# Patient Record
Sex: MALE | Race: BLACK OR AFRICAN AMERICAN | URBAN - METROPOLITAN AREA
[De-identification: names, ages, dates, MRNs, and addresses within clinical notes are randomized per-mention and may not be internally consistent; named-entity substitution may affect disease eponyms.]

---

## 2017-04-06 ENCOUNTER — OFFICE VISIT (OUTPATIENT)
Dept: FAMILY MEDICINE CLINIC | Age: 32
End: 2017-04-06

## 2017-04-06 VITALS
WEIGHT: 315 LBS | RESPIRATION RATE: 17 BRPM | HEART RATE: 91 BPM | HEIGHT: 70 IN | DIASTOLIC BLOOD PRESSURE: 62 MMHG | BODY MASS INDEX: 45.1 KG/M2 | OXYGEN SATURATION: 99 % | SYSTOLIC BLOOD PRESSURE: 110 MMHG | TEMPERATURE: 97.8 F

## 2017-04-06 DIAGNOSIS — E11.9 TYPE 2 DIABETES MELLITUS WITHOUT COMPLICATION, WITHOUT LONG-TERM CURRENT USE OF INSULIN (HCC): Primary | ICD-10-CM

## 2017-04-06 DIAGNOSIS — I10 ESSENTIAL HYPERTENSION WITH GOAL BLOOD PRESSURE LESS THAN 130/80: ICD-10-CM

## 2017-04-06 DIAGNOSIS — E78.00 HYPERCHOLESTEROLEMIA: ICD-10-CM

## 2017-04-06 DIAGNOSIS — Z29.9 PREVENTIVE MEASURE: ICD-10-CM

## 2017-04-06 RX ORDER — PRAVASTATIN SODIUM 20 MG/1
20 TABLET ORAL
COMMUNITY
End: 2017-04-06 | Stop reason: SDUPTHER

## 2017-04-06 RX ORDER — LISINOPRIL 20 MG/1
20 TABLET ORAL DAILY
Qty: 30 TAB | Refills: 3 | Status: SHIPPED | OUTPATIENT
Start: 2017-04-06 | End: 2017-07-24 | Stop reason: SDUPTHER

## 2017-04-06 RX ORDER — METFORMIN HYDROCHLORIDE 500 MG/1
TABLET ORAL 2 TIMES DAILY WITH MEALS
COMMUNITY
End: 2017-04-13 | Stop reason: DRUGHIGH

## 2017-04-06 RX ORDER — PRAVASTATIN SODIUM 20 MG/1
20 TABLET ORAL
Qty: 30 TAB | Refills: 3 | Status: SHIPPED | OUTPATIENT
Start: 2017-04-06 | End: 2017-08-10 | Stop reason: SDUPTHER

## 2017-04-06 RX ORDER — LISINOPRIL 20 MG/1
TABLET ORAL DAILY
COMMUNITY
End: 2017-04-06 | Stop reason: SDUPTHER

## 2017-04-06 NOTE — PATIENT INSTRUCTIONS
Please contact our office if you have any questions about your visit today. 1.) Please get fasting labs next week Wednesday and return next week Thursday for regular appointment. Lab hours: 7:30 am to 4 pm Monday through fasting. Must be 8 hours fasting.     2.) Medications were sent to the pharmacy.

## 2017-04-06 NOTE — PROGRESS NOTES
Chief Complaint   Patient presents with   Peggy Stack Establish Care     1. Have you been to the ER, urgent care clinic since your last visit? Hospitalized since your last visit? No    2. Have you seen or consulted any other health care providers outside of the [address] [place] [address] since your last visit? Include any pap smears or colon screening.  No

## 2017-04-06 NOTE — MR AVS SNAPSHOT
Visit Information Date & Time Provider Department Dept. Phone Encounter #  
 4/6/2017 10:30 AM Margot Rocha [id number] Follow-up Instructions Return in about 1 week (around 4/13/2017) for Follow Up on Labs . Upcoming Health Maintenance Date Due INFLUENZA AGE 9 TO ADULT 8/1/2016 DTaP/Tdap/Td series (2 - Td) 3/5/2024 Allergies as of 4/6/2017  Review Complete On: 8/1/9182 By: Helen David. Justin Lee LPN No Known Allergies Current Immunizations  Never Reviewed No immunizations on file. Not reviewed this visit You Were Diagnosed With   
  
 Codes Comments Type 2 diabetes mellitus without complication, without long-term current use of insulin (HCC)    -  Primary ICD-10-CM: E11.9 ICD-9-CM: 250.00 Preventive measure     ICD-10-CM: Z29.9 ICD-9-CM: V07.9 Essential hypertension with goal blood pressure less than 130/80     ICD-10-CM: I10 
ICD-9-CM: 401.9 Hypercholesterolemia     ICD-10-CM: E78.00 ICD-9-CM: 272.0 Vitals BP Pulse Temp Resp Height(growth percentile) Weight(growth percentile) 110/62 (BP 1 Location: Left arm, BP Patient Position: Sitting) 91 97.8 °F (36.6 °C) (Oral) 17 5' 10\" (1.778 m) (!) 362 lb (164.2 kg) SpO2 BMI Smoking Status 99% 51.94 kg/m2 Never Smoker BMI and BSA Data Body Mass Index Body Surface Area 51.94 kg/m 2 2.85 m 2 Preferred Pharmacy Pharmacy Name Phone [facility] [address] [phone] Your Updated Medication List  
  
   
This list is accurate as of: 4/6/17 11:33 AM.  Always use your most recent med list.  
  
  
  
  
 lisinopril 20 mg tablet Commonly known as:  Verónica Camel Take 1 Tab by mouth daily. metFORMIN 500 mg tablet Commonly known as:  GLUCOPHAGE Take  by mouth two (2) times daily (with meals). pravastatin 20 mg tablet Commonly known as:  PRAVACHOL Take 1 Tab by mouth nightly. Prescriptions Sent to Pharmacy Refills  
 pravastatin (PRAVACHOL) 20 mg tablet 3 Sig: Take 1 Tab by mouth nightly. Class: Normal  
 Pharmacy: [address] Ph #: [phone] Route: Oral  
 lisinopril (PRINIVIL, ZESTRIL) 20 mg tablet 3 Sig: Take 1 Tab by mouth daily. Class: Normal  
 Pharmacy: [address] Ph #: [phone] Route: Oral  
  
Follow-up Instructions Return in about 1 week (around 4/13/2017) for Follow Up on Labs . To-Do List   
 04/06/2017 Lab:  HEMOGLOBIN A1C WITH EAG   
  
 04/06/2017 Lab:  LIPID CASCADE W/REFLEX APO B   
  
 04/06/2017 Lab:  MAGNESIUM   
  
 04/06/2017 Lab:  MICROALBUMIN, UR, RAND W/ MICROALBUMIN/CREA RATIO   
  
 04/06/2017 Lab:  PHOSPHORUS   
  
 04/06/2017 Lab:  VITAMIN D, 25 HYDROXY Around 07/05/2017 Lab:  CBC WITH AUTOMATED DIFF Around 07/05/2017 Lab:  METABOLIC PANEL, COMPREHENSIVE Around 07/05/2017 Lab:  SED RATE (ESR) Around 07/05/2017 Lab:  T4, FREE Around 07/05/2017 Lab:  TSH 3RD GENERATION Patient Instructions Please contact our office if you have any questions about your visit today. 1.) Please get fasting labs next week Wednesday and return next week Thursday for regular appointment. Lab hours: 7:30 am to 4 pm Monday through fasting. Must be 8 hours fasting.  
 
2.) Medications were sent to the pharmacy. Introducing [facility] & HEALTH SERVICES! Jose Haskins introduces The [facility] [place] patient portal. Now you can access parts of your medical record, email your doctor's office, and request medication refills online. 1. In your internet browser, go to [email or web address] 2. Click on the First Time User? Click Here link in the Sign In box.  You will see the New Member Sign Up page. 3. Enter your Trading Bl[employer] Access Code exactly as it appears below. You will not need to use this code after youve completed the sign-up process. If you do not sign up before the expiration date, you must request a new code. · [employer] Access Code: [email or web address] Expires: 7/5/2017 10:38 AM 
 
4. Enter the last four digits of your Social Security Number (xxxx) and Date of Birth (mm/dd/yyyy) as indicated and click Submit. You will be taken to the next sign-up page. 5. Create a Trading Blox ID. This will be your Trading Blox login ID and cannot be changed, so think of one that is secure and easy to remember. 6. Create a Trading Blox password. You can change your password at any time. 7. Enter your Password Reset Question and Answer. This can be used at a later time if you forget your password. 8. Enter your e-mail address. You will receive e-mail notification when new information is available in [address] 9. Click Sign Up. You can now view and download portions of your medical record. 10. Click the Download Summary menu link to download a portable copy of your medical information. If you have questions, please visit the Frequently Asked Questions section of the Trading Bl[employer] website. Remember, Trading [employer] is NOT to be used for urgent needs. For medical emergencies, dial 911. Now available from your iPhone and Android! Please provide this summary of care documentation to your next provider. Your primary care clinician is listed as Almita Overton. If you have any questions after today's visit, please call [phone].

## 2017-04-06 NOTE — PROGRESS NOTES
History and Physical    Patient: Nanette Luna MRN: [id number]  SSN: [id number]    YOB: 1985  Age: 28 y.o. Sex: male      Subjective:      Nanette Luna is a 28 y.o. male who is here to establish care. Patient recently moved here from [facility]. He was with his old PCP Dr. Tesha Pena for about a year. He states that he has concerns about his blood pressure medications--he has heard that lisinopril should not be used in  [place]. He has been on lisinopril since 2014. He was officially diagnosed with DM 2 in 2016. Past Medical History:   Diagnosis Date    Diabetes ([facility] [address]     Hypercholesterolemia     Hypertension      No past surgical history on file. Family History   Problem Relation Age of Onset    Hypertension Mother     Cancer Father      Colon diagnosed at age 68    Hypertension Father      Social History   Substance Use Topics    Smoking status: Never Smoker    Smokeless tobacco: Former User    Alcohol use 1.2 oz/week     2 Cans of beer per week   - Denies illicits  - Currently engaged  - Works as  at [employer]        Prior to Admission medications    Medication Sig Start Date End Date Taking? Authorizing Provider   pravastatin (PRAVACHOL) 20 mg tablet Take 20 mg by mouth nightly. Yes Historical Provider   lisinopril (PRINIVIL, ZESTRIL) 20 mg tablet Take  by mouth daily. Yes Historical Provider   metFORMIN (GLUCOPHAGE) 500 mg tablet Take  by mouth two (2) times daily (with meals).    Yes Historical Provider        No Known Allergies    Review of Systems:  Constitutional: negative  Eyes: negative  Ears, Nose, Mouth, Throat, and Face: negative  Respiratory: negative  Cardiovascular: negative  Gastrointestinal: negative  Genitourinary:negative  Integument/Breast: negative  Hematologic/Lymphatic: negative  Musculoskeletal:negative  Neurological: negative    Objective:     Vitals:    04/06/17 1055   BP: 110/62   Pulse: 91   Resp: 17   Temp: 97.8 °F (36.6 °C)   TempSrc: Oral   SpO2: 99%   Weight: (!) 362 lb (164.2 kg)   Height: 5' 10\" (1.778 m)        Physical Exam:  GENERAL: alert, cooperative, no distress, appears stated age, morbidly obese  EYE: conjunctivae/corneas clear. PERRL, EOM's intact. Fundi benign  LYMPHATIC: Cervical, supraclavicular, and axillary nodes normal.   THROAT & NECK: normal and no erythema or exudates noted. LUNG: clear to auscultation bilaterally  HEART: regular rate and rhythm, S1, S2 normal, no murmur, click, rub or gallop  ABDOMEN: soft, non-tender. Bowel sounds normal. No masses,  no organomegaly, abnormal findings:  obese  EXTREMITIES:  extremities normal, atraumatic, no cyanosis or edema  SKIN: Normal.  NEUROLOGIC: negative  PSYCHIATRIC: non focal      Labs:   Labs ordered as noted below. Assessment:     1.) Non-Insulin Dependent Diabetes Mellitus Type 2: Patient will continue on Metformin. HgbA1C ordered. 2.) Hyperlipidemia: Patient's pravastatin reordered. Lipid cascade ordered. 3.) Morbid Obesity: Will discuss weight loss options on next visit. 4.) Essential Hypertension: I did go in depth regarding the rationale as to why he was placed on lisinopril. This was reordered and sent to his local pharmacy. 5.) Preventive: Labs ordered as noted below. Patient will return in 1 week for follow up.        Plan:     Orders Placed This Encounter    LIPID CASCADE W/REFLEX APO B    CBC WITH AUTOMATED DIFF    METABOLIC PANEL, COMPREHENSIVE    TSH 3RD GENERATION    T4, FREE    SED RATE (ESR)    HEMOGLOBIN A1C WITH EAG    VITAMIN D, 25 HYDROXY    MAGNESIUM    PHOSPHORUS    MICROALBUMIN, UR, RAND W/ MICROALBUMIN/CREA RATIO    DISCONTD: pravastatin (PRAVACHOL) 20 mg tablet    DISCONTD: lisinopril (PRINIVIL, ZESTRIL) 20 mg tablet    metFORMIN (GLUCOPHAGE) 500 mg tablet    pravastatin (PRAVACHOL) 20 mg tablet    lisinopril (PRINIVIL, ZESTRIL) 20 mg tablet                 Signed By: Serena Santiago, DO     April 6, 2017

## 2017-04-12 ENCOUNTER — HOSPITAL ENCOUNTER (OUTPATIENT)
Dept: LAB | Age: 32
Discharge: HOME | End: 2017-04-12
Payer: SELF-PAY

## 2017-04-12 DIAGNOSIS — E11.9 TYPE 2 DIABETES MELLITUS WITHOUT COMPLICATION, WITHOUT LONG-TERM CURRENT USE OF INSULIN (HCC): ICD-10-CM

## 2017-04-12 DIAGNOSIS — I10 ESSENTIAL HYPERTENSION WITH GOAL BLOOD PRESSURE LESS THAN 130/80: ICD-10-CM

## 2017-04-12 DIAGNOSIS — Z29.9 PREVENTIVE MEASURE: ICD-10-CM

## 2017-04-12 DIAGNOSIS — E78.00 HYPERCHOLESTEROLEMIA: ICD-10-CM

## 2017-04-12 LAB
25(OH)D3 SERPL-MCNC: 8.3 NG/ML (ref 30–100)
ALBUMIN SERPL BCP-MCNC: 3.5 G/DL (ref 3.4–5)
ALBUMIN/GLOB SERPL: 1 {RATIO} (ref 0.8–1.7)
ALP SERPL-CCNC: 41 U/L (ref 45–117)
ALT SERPL-CCNC: 52 U/L (ref 16–61)
ANION GAP BLD CALC-SCNC: 9 MMOL/L (ref 3–18)
AST SERPL W P-5'-P-CCNC: 33 U/L (ref 15–37)
BASOPHILS # BLD AUTO: 0 K/UL (ref 0–0.06)
BASOPHILS # BLD: 0 % (ref 0–2)
BILIRUB SERPL-MCNC: 0.3 MG/DL (ref 0.2–1)
BUN SERPL-MCNC: 14 MG/DL (ref 7–18)
BUN/CREAT SERPL: 14 (ref 12–20)
CALCIUM SERPL-MCNC: 8.8 MG/DL (ref 8.5–10.1)
CHLORIDE SERPL-SCNC: 103 MMOL/L (ref 100–108)
CO2 SERPL-SCNC: 27 MMOL/L (ref 21–32)
CREAT SERPL-MCNC: 1.02 MG/DL (ref 0.6–1.3)
DIFFERENTIAL METHOD BLD: ABNORMAL
EOSINOPHIL # BLD: 0.3 K/UL (ref 0–0.4)
EOSINOPHIL NFR BLD: 5 % (ref 0–5)
ERYTHROCYTE [DISTWIDTH] IN BLOOD BY AUTOMATED COUNT: 14.3 % (ref 11.6–14.5)
ERYTHROCYTE [SEDIMENTATION RATE] IN BLOOD: 18 MM/HR (ref 0–15)
EST. AVERAGE GLUCOSE BLD GHB EST-MCNC: 157 MG/DL
GLOBULIN SER CALC-MCNC: 3.6 G/DL (ref 2–4)
GLUCOSE SERPL-MCNC: 97 MG/DL (ref 74–99)
HBA1C MFR BLD: 7.1 % (ref 4.2–5.6)
HCT VFR BLD AUTO: 38.4 % (ref 36–48)
HGB BLD-MCNC: 12.1 G/DL (ref 13–16)
LYMPHOCYTES # BLD AUTO: 47 % (ref 21–52)
LYMPHOCYTES # BLD: 3 K/UL (ref 0.9–3.6)
MAGNESIUM SERPL-MCNC: 1.8 MG/DL (ref 1.6–2.6)
MCH RBC QN AUTO: 27.4 PG (ref 24–34)
MCHC RBC AUTO-ENTMCNC: 31.5 G/DL (ref 31–37)
MCV RBC AUTO: 87.1 FL (ref 74–97)
MONOCYTES # BLD: 0.5 K/UL (ref 0.05–1.2)
MONOCYTES NFR BLD AUTO: 8 % (ref 3–10)
NEUTS SEG # BLD: 2.6 K/UL (ref 1.8–8)
NEUTS SEG NFR BLD AUTO: 40 % (ref 40–73)
PHOSPHATE SERPL-MCNC: 3.1 MG/DL (ref 2.5–4.9)
PLATELET # BLD AUTO: 299 K/UL (ref 135–420)
PMV BLD AUTO: 10.6 FL (ref 9.2–11.8)
POTASSIUM SERPL-SCNC: 4.1 MMOL/L (ref 3.5–5.5)
PROT SERPL-MCNC: 7.1 G/DL (ref 6.4–8.2)
RBC # BLD AUTO: 4.41 M/UL (ref 4.7–5.5)
SODIUM SERPL-SCNC: 139 MMOL/L (ref 136–145)
T4 FREE SERPL-MCNC: 1.3 NG/DL (ref 0.7–1.5)
TSH SERPL DL<=0.05 MIU/L-ACNC: 0.75 UIU/ML (ref 0.36–3.74)
WBC # BLD AUTO: 6.4 K/UL (ref 4.6–13.2)

## 2017-04-12 PROCEDURE — 83735 ASSAY OF MAGNESIUM: CPT | Performed by: INTERNAL MEDICINE

## 2017-04-12 PROCEDURE — 83036 HEMOGLOBIN GLYCOSYLATED A1C: CPT | Performed by: INTERNAL MEDICINE

## 2017-04-12 PROCEDURE — 84443 ASSAY THYROID STIM HORMONE: CPT | Performed by: INTERNAL MEDICINE

## 2017-04-12 PROCEDURE — 36415 COLL VENOUS BLD VENIPUNCTURE: CPT | Performed by: INTERNAL MEDICINE

## 2017-04-12 PROCEDURE — 80061 LIPID PANEL: CPT | Performed by: INTERNAL MEDICINE

## 2017-04-12 PROCEDURE — 84439 ASSAY OF FREE THYROXINE: CPT | Performed by: INTERNAL MEDICINE

## 2017-04-12 PROCEDURE — 82306 VITAMIN D 25 HYDROXY: CPT | Performed by: INTERNAL MEDICINE

## 2017-04-12 PROCEDURE — 85025 COMPLETE CBC W/AUTO DIFF WBC: CPT | Performed by: INTERNAL MEDICINE

## 2017-04-12 PROCEDURE — 84100 ASSAY OF PHOSPHORUS: CPT | Performed by: INTERNAL MEDICINE

## 2017-04-12 PROCEDURE — 82172 ASSAY OF APOLIPOPROTEIN: CPT | Performed by: INTERNAL MEDICINE

## 2017-04-12 PROCEDURE — 80053 COMPREHEN METABOLIC PANEL: CPT | Performed by: INTERNAL MEDICINE

## 2017-04-12 PROCEDURE — 85652 RBC SED RATE AUTOMATED: CPT | Performed by: INTERNAL MEDICINE

## 2017-04-13 ENCOUNTER — OFFICE VISIT (OUTPATIENT)
Dept: FAMILY MEDICINE CLINIC | Age: 32
End: 2017-04-13

## 2017-04-13 VITALS
SYSTOLIC BLOOD PRESSURE: 108 MMHG | RESPIRATION RATE: 17 BRPM | WEIGHT: 315 LBS | BODY MASS INDEX: 45.1 KG/M2 | HEIGHT: 70 IN | HEART RATE: 76 BPM | DIASTOLIC BLOOD PRESSURE: 69 MMHG | TEMPERATURE: 97.3 F | OXYGEN SATURATION: 98 %

## 2017-04-13 DIAGNOSIS — E55.9 VITAMIN D DEFICIENCY: ICD-10-CM

## 2017-04-13 DIAGNOSIS — E11.9 TYPE 2 DIABETES MELLITUS WITHOUT COMPLICATION, WITHOUT LONG-TERM CURRENT USE OF INSULIN (HCC): Primary | ICD-10-CM

## 2017-04-13 DIAGNOSIS — I10 ESSENTIAL HYPERTENSION WITH GOAL BLOOD PRESSURE LESS THAN 130/80: ICD-10-CM

## 2017-04-13 RX ORDER — ERGOCALCIFEROL 1.25 MG/1
50000 CAPSULE ORAL
Qty: 12 CAP | Refills: 3 | Status: SHIPPED | OUTPATIENT
Start: 2017-04-13 | End: 2018-06-14 | Stop reason: SDUPTHER

## 2017-04-13 RX ORDER — METFORMIN HYDROCHLORIDE 1000 MG/1
1000 TABLET ORAL 2 TIMES DAILY WITH MEALS
Qty: 60 TAB | Refills: 3 | Status: SHIPPED | OUTPATIENT
Start: 2017-04-13 | End: 2017-08-31 | Stop reason: SDUPTHER

## 2017-04-13 NOTE — PATIENT INSTRUCTIONS
1.) Please get labs a week before next visit    2.) Return in 3 months for follow up     Please contact our office if you have any questions about your visit today.

## 2017-04-13 NOTE — MR AVS SNAPSHOT
Visit Information Date & Time Provider Department Dept. Phone Encounter #  
 4/13/2017 10:30 AM Jayne AlcantaraMargot 77 [id number] Follow-up Instructions Return in about 3 months (around 7/13/2017) for Regular Follow Up. Upcoming Health Maintenance Date Due MICROALBUMIN Q1 11/19/1996 EYE EXAM RETINAL OR DILATED Q1 11/19/1996 Pneumococcal 19-64 Medium Risk (1 of 1 - PPSV23) 11/19/2005 INFLUENZA AGE 9 TO ADULT 8/1/2016 HEMOGLOBIN A1C Q6M 10/12/2017 LIPID PANEL Q1 4/12/2018 FOOT EXAM Q1 4/13/2018 DTaP/Tdap/Td series (2 - Td) 3/5/2024 Allergies as of 4/13/2017  Review Complete On: 9/97/5798 By: Jose F Leiva. Rosita Grullon LPN No Known Allergies Current Immunizations  Never Reviewed No immunizations on file. Not reviewed this visit You Were Diagnosed With   
  
 Codes Comments Type 2 diabetes mellitus without complication, without long-term current use of insulin (HCC)    -  Primary ICD-10-CM: E11.9 ICD-9-CM: 250.00 Essential hypertension with goal blood pressure less than 130/80     ICD-10-CM: I10 
ICD-9-CM: 401.9 Vitamin D deficiency     ICD-10-CM: E55.9 ICD-9-CM: 268.9 Vitals BP Pulse Temp Resp Height(growth percentile) Weight(growth percentile) 108/69 (BP 1 Location: Right arm, BP Patient Position: Sitting) 76 97.3 °F (36.3 °C) (Oral) 17 5' 10\" (1.778 m) (!) 368 lb (166.9 kg) SpO2 BMI Smoking Status 98% 52.8 kg/m2 Never Smoker BMI and BSA Data Body Mass Index Body Surface Area  
 52.8 kg/m 2 2.87 m 2 Preferred Pharmacy Pharmacy Name Phone [facility] [address] [place] [address], [place] [address] Roshni Cox [phone] Your Updated Medication List  
  
   
This list is accurate as of: 4/13/17 11:07 AM.  Always use your most recent med list.  
  
  
  
  
 ergocalciferol 50,000 unit capsule Commonly known as:  ERGOCALCIFEROL Take 1 Cap by mouth every seven (7) days. lisinopril 20 mg tablet Commonly known as:  Gaby Cozier Take 1 Tab by mouth daily. metFORMIN 500 mg tablet Commonly known as:  GLUCOPHAGE Take  by mouth two (2) times daily (with meals). pravastatin 20 mg tablet Commonly known as:  PRAVACHOL Take 1 Tab by mouth nightly. Prescriptions Sent to Pharmacy Refills  
 ergocalciferol (ERGOCALCIFEROL) 50,000 unit capsule 3 Sig: Take 1 Cap by mouth every seven (7) days. Class: Normal  
 Pharmacy: [address], [place] [address] Ch[place]o Cohen Ph #: [phone] Route: Oral  
  
Follow-up Instructions Return in about 3 months (around 7/13/2017) for Regular Follow Up. To-Do List   
 04/13/2017 Lab:  HEMOGLOBIN A1C WITH EAG   
  
 04/13/2017 Lab:  MICROALBUMIN, UR, RAND W/ MICROALBUMIN/CREA RATIO   
  
 04/13/2017 Lab:  VITAMIN D, 25 HYDROXY Around 07/12/2017 Lab:  CBC WITH AUTOMATED DIFF Around 07/12/2017 Lab:  METABOLIC PANEL, COMPREHENSIVE Patient Instructions 1.) Please get labs a week before next visit 2.) Return in 3 months for follow up Please contact our office if you have any questions about your visit today. Introducing [facility] & HEALTH SERVICES! Ora Gates introduces [employer] patient portal. Now you can access parts of your medical record, email your doctor's office, and request medication refills online. 1. In your internet browser, go to [email or web address] 2. Click on the First Time User? Click Here link in the Sign In box. You will see the New Member Sign Up page. 3. Enter your Proximiant Access Code exactly as it appears below. You will not need to use this code after youve completed the sign-up process. If you do not sign up before the expiration date, you must request a new code. · [employer] Access Code: [email or web address] Expires: 7/5/2017 10:38 AM 
 
4. Enter the last four digits of your Social Security Number (xxxx) and Date of Birth (mm/dd/yyyy) as indicated and click Submit. You will be taken to the next sign-up page. 5. Create a BitPay ID. This will be your BitPay login ID and cannot be changed, so think of one that is secure and easy to remember. 6. Create a BitPay password. You can change your password at any time. 7. Enter your Password Reset Question and Answer. This can be used at a later time if you forget your password. 8. Enter your e-mail address. You will receive e-mail notification when new information is available in [address] 9. Click Sign Up. You can now view and download portions of your medical record. 10. Click the Download Summary menu link to download a portable copy of your medical information. If you have questions, please visit the Frequently Asked Questions section of the BitPay website. Remember, BitPay is NOT to be used for urgent needs. For medical emergencies, dial 911. Now available from your iPhone and Android! Please provide this summary of care documentation to your next provider. Your primary care clinician is listed as Debra Peraza. If you have any questions after today's visit, please call [phone].

## 2017-04-13 NOTE — PROGRESS NOTES
Chief Complaint   Patient presents with    Hypertension    Diabetes    Obesity     1. Have you been to the ER, urgent care clinic since your last visit? Hospitalized since your last visit? No    2. Have you seen or consulted any other health care providers outside of the [address] [place] [address] since your last visit? Include any pap smears or colon screening.  No

## 2017-04-14 LAB
APO B SERPL-MCNC: 96 MG/DL (ref 52–135)
CHOLEST SERPL-MCNC: 147 MG/DL (ref 100–199)
HDLC SERPL-MCNC: 34 MG/DL
LDLC SERPL CALC-MCNC: 84 MG/DL (ref 0–99)
LDLC/HDLC SERPL: 2.5 RATIO UNITS (ref 0–3.6)
NONHDLC SERPL-MCNC: 113 MG/DL (ref 0–129)
TRIGL SERPL-MCNC: 145 MG/DL (ref 0–149)

## 2017-07-24 DIAGNOSIS — I10 ESSENTIAL HYPERTENSION WITH GOAL BLOOD PRESSURE LESS THAN 130/80: ICD-10-CM

## 2017-07-24 DIAGNOSIS — E11.9 TYPE 2 DIABETES MELLITUS WITHOUT COMPLICATION, WITHOUT LONG-TERM CURRENT USE OF INSULIN (HCC): ICD-10-CM

## 2017-07-25 RX ORDER — LISINOPRIL 20 MG/1
TABLET ORAL
Qty: 30 TAB | Refills: 0 | Status: SHIPPED | OUTPATIENT
Start: 2017-07-25 | End: 2017-08-24 | Stop reason: SDUPTHER

## 2017-08-10 DIAGNOSIS — E11.9 TYPE 2 DIABETES MELLITUS WITHOUT COMPLICATION, WITHOUT LONG-TERM CURRENT USE OF INSULIN (HCC): ICD-10-CM

## 2017-08-10 DIAGNOSIS — E78.00 HYPERCHOLESTEROLEMIA: ICD-10-CM

## 2017-08-10 DIAGNOSIS — I10 ESSENTIAL HYPERTENSION WITH GOAL BLOOD PRESSURE LESS THAN 130/80: ICD-10-CM

## 2017-08-10 RX ORDER — PRAVASTATIN SODIUM 20 MG/1
TABLET ORAL
Qty: 30 TAB | Refills: 3 | Status: SHIPPED | OUTPATIENT
Start: 2017-08-10 | End: 2017-12-15 | Stop reason: SDUPTHER

## 2017-08-31 DIAGNOSIS — E11.9 TYPE 2 DIABETES MELLITUS WITHOUT COMPLICATION, WITHOUT LONG-TERM CURRENT USE OF INSULIN (HCC): ICD-10-CM

## 2017-08-31 RX ORDER — METFORMIN HYDROCHLORIDE 1000 MG/1
TABLET ORAL
Qty: 60 TAB | Refills: 3 | Status: SHIPPED | OUTPATIENT
Start: 2017-08-31 | End: 2018-01-20 | Stop reason: SDUPTHER

## 2017-12-15 DIAGNOSIS — E11.9 TYPE 2 DIABETES MELLITUS WITHOUT COMPLICATION, WITHOUT LONG-TERM CURRENT USE OF INSULIN (HCC): ICD-10-CM

## 2017-12-15 DIAGNOSIS — E78.00 HYPERCHOLESTEROLEMIA: ICD-10-CM

## 2017-12-15 DIAGNOSIS — I10 ESSENTIAL HYPERTENSION WITH GOAL BLOOD PRESSURE LESS THAN 130/80: ICD-10-CM

## 2017-12-19 RX ORDER — PRAVASTATIN SODIUM 20 MG/1
TABLET ORAL
Qty: 30 TAB | Refills: 3 | Status: SHIPPED | OUTPATIENT
Start: 2017-12-19 | End: 2018-06-14 | Stop reason: SDUPTHER

## 2018-01-12 DIAGNOSIS — E11.9 TYPE 2 DIABETES MELLITUS WITHOUT COMPLICATION, WITHOUT LONG-TERM CURRENT USE OF INSULIN (HCC): ICD-10-CM

## 2018-01-12 DIAGNOSIS — I10 ESSENTIAL HYPERTENSION WITH GOAL BLOOD PRESSURE LESS THAN 130/80: ICD-10-CM

## 2018-01-14 RX ORDER — LISINOPRIL 20 MG/1
TABLET ORAL
Qty: 30 TAB | Refills: 3 | Status: SHIPPED | OUTPATIENT
Start: 2018-01-14 | End: 2018-06-14 | Stop reason: SDUPTHER

## 2018-01-20 DIAGNOSIS — E11.9 TYPE 2 DIABETES MELLITUS WITHOUT COMPLICATION, WITHOUT LONG-TERM CURRENT USE OF INSULIN (HCC): ICD-10-CM

## 2018-01-21 RX ORDER — METFORMIN HYDROCHLORIDE 1000 MG/1
TABLET ORAL
Qty: 60 TAB | Refills: 3 | Status: SHIPPED | OUTPATIENT
Start: 2018-01-21 | End: 2018-06-14 | Stop reason: SDUPTHER

## 2018-06-01 DIAGNOSIS — E11.9 TYPE 2 DIABETES MELLITUS WITHOUT COMPLICATION, WITHOUT LONG-TERM CURRENT USE OF INSULIN (HCC): ICD-10-CM

## 2018-06-01 DIAGNOSIS — E55.9 VITAMIN D DEFICIENCY: ICD-10-CM

## 2018-06-01 DIAGNOSIS — I10 ESSENTIAL HYPERTENSION WITH GOAL BLOOD PRESSURE LESS THAN 130/80: ICD-10-CM

## 2018-06-01 DIAGNOSIS — E78.00 HYPERCHOLESTEROLEMIA: ICD-10-CM

## 2018-06-06 ENCOUNTER — TELEPHONE (OUTPATIENT)
Dept: FAMILY MEDICINE CLINIC | Age: 33
End: 2018-06-06

## 2018-06-06 RX ORDER — METFORMIN HYDROCHLORIDE 1000 MG/1
TABLET ORAL
Qty: 60 TAB | Refills: 3 | Status: CANCELLED | OUTPATIENT
Start: 2018-06-06

## 2018-06-06 RX ORDER — PRAVASTATIN SODIUM 20 MG/1
TABLET ORAL
Qty: 30 TAB | Refills: 3 | Status: CANCELLED | OUTPATIENT
Start: 2018-06-06

## 2018-06-06 RX ORDER — ERGOCALCIFEROL 1.25 MG/1
50000 CAPSULE ORAL
Qty: 12 CAP | Refills: 3 | Status: CANCELLED | OUTPATIENT
Start: 2018-06-06

## 2018-06-06 RX ORDER — LISINOPRIL 20 MG/1
TABLET ORAL
Qty: 30 TAB | Refills: 3 | Status: CANCELLED | OUTPATIENT
Start: 2018-06-06

## 2018-06-14 ENCOUNTER — OFFICE VISIT (OUTPATIENT)
Dept: FAMILY MEDICINE CLINIC | Age: 33
End: 2018-06-14

## 2018-06-14 VITALS
TEMPERATURE: 98.5 F | RESPIRATION RATE: 20 BRPM | HEIGHT: 70 IN | DIASTOLIC BLOOD PRESSURE: 90 MMHG | HEART RATE: 59 BPM | WEIGHT: 315 LBS | SYSTOLIC BLOOD PRESSURE: 160 MMHG | BODY MASS INDEX: 45.1 KG/M2

## 2018-06-14 DIAGNOSIS — E55.9 VITAMIN D DEFICIENCY: ICD-10-CM

## 2018-06-14 DIAGNOSIS — E78.00 HYPERCHOLESTEROLEMIA: ICD-10-CM

## 2018-06-14 DIAGNOSIS — E11.9 TYPE 2 DIABETES MELLITUS WITHOUT COMPLICATION, WITHOUT LONG-TERM CURRENT USE OF INSULIN (HCC): ICD-10-CM

## 2018-06-14 DIAGNOSIS — I10 ESSENTIAL HYPERTENSION WITH GOAL BLOOD PRESSURE LESS THAN 130/80: ICD-10-CM

## 2018-06-14 PROBLEM — E66.01 OBESITY, MORBID (HCC): Status: ACTIVE | Noted: 2018-06-14

## 2018-06-14 RX ORDER — ERGOCALCIFEROL 1.25 MG/1
50000 CAPSULE ORAL
Qty: 12 CAP | Refills: 1 | Status: SHIPPED | OUTPATIENT
Start: 2018-06-14 | End: 2018-12-21 | Stop reason: SDUPTHER

## 2018-06-14 RX ORDER — METFORMIN HYDROCHLORIDE 1000 MG/1
TABLET ORAL
Qty: 60 TAB | Refills: 2 | Status: SHIPPED | OUTPATIENT
Start: 2018-06-14 | End: 2018-09-28 | Stop reason: SDUPTHER

## 2018-06-14 RX ORDER — LISINOPRIL 20 MG/1
TABLET ORAL
Qty: 30 TAB | Refills: 2 | Status: SHIPPED | OUTPATIENT
Start: 2018-06-14 | End: 2018-09-28 | Stop reason: SDUPTHER

## 2018-06-14 RX ORDER — PRAVASTATIN SODIUM 20 MG/1
TABLET ORAL
Qty: 30 TAB | Refills: 2 | Status: SHIPPED | OUTPATIENT
Start: 2018-06-14 | End: 2018-09-28 | Stop reason: SDUPTHER

## 2018-06-14 NOTE — MR AVS SNAPSHOT
[address] [facility] 
 
 
 [place] [address] [address] [address] [address] [phone] Patient: Kyrie Singer MRN: [id number] :1985 Visit Information Date & Time Provider Department Dept. Phone Encounter #  
 2018  1:30 PM Luciano Stanton NP [address] [phone] Follow-up Instructions Return in about 1 month (around 2018), or if symptoms worsen or fail to improve. Upcoming Health Maintenance Date Due MICROALBUMIN Q1 1996 EYE EXAM RETINAL OR DILATED Q1 1996 Pneumococcal 19-64 Medium Risk (1 of 1 - PPSV23) 2005 HEMOGLOBIN A1C Q6M 10/12/2017 LIPID PANEL Q1 2018 FOOT EXAM Q1 2018 Influenza Age 5 to Adult 2018 DTaP/Tdap/Td series (2 - Td) 3/5/2024 Allergies as of 2018  Review Complete On: 2017 By: Annette Deluna,  No Known Allergies Current Immunizations  Never Reviewed No immunizations on file. Not reviewed this visit You Were Diagnosed With   
  
 Codes Comments Type 2 diabetes mellitus without complication, without long-term current use of insulin (HCC)     ICD-10-CM: E11.9 ICD-9-CM: 250.00 Essential hypertension with goal blood pressure less than 130/80     ICD-10-CM: I10 
ICD-9-CM: 401.9 Hypercholesterolemia     ICD-10-CM: E78.00 ICD-9-CM: 272.0 Vitamin D deficiency     ICD-10-CM: E55.9 ICD-9-CM: 268.9 Vitals BP Pulse Temp Resp Height(growth percentile) Weight(growth percentile) 160/90 (BP 1 Location: Left arm, BP Patient Position: Sitting) (!) 59 98.5 °F (36.9 °C) (Oral) 20 5' 10\" (1.778 m) (!) 368 lb (166.9 kg) BMI Smoking Status 52.8 kg/m2 Never Smoker Vitals History BMI and BSA Data Body Mass Index Body Surface Area  
 52.8 kg/m 2 2.87 m 2 Preferred Pharmacy Pharmacy Name Phone [address] [facility] [address] [facility] Karen Braga [phone] Your Updated Medication List  
  
   
This list is accurate as of 6/14/18  2:41 PM.  Always use your most recent med list.  
  
  
  
  
 ergocalciferol 50,000 unit capsule Commonly known as:  ERGOCALCIFEROL Take 1 Cap by mouth every seven (7) days. lisinopril 20 mg tablet Commonly known as:  PRINIVIL, ZESTRIL  
TAKE ONE TABLET BY MOUTH ONCE DAILY  
  
 metFORMIN 1,000 mg tablet Commonly known as:  GLUCOPHAGE  
TAKE ONE TABLET BY MOUTH TWICE DAILY WITH MEALS  
  
 pravastatin 20 mg tablet Commonly known as:  PRAVACHOL  
TAKE ONE TABLET BY MOUTH NIGHTLY Prescriptions Sent to Pharmacy Refills  
 metFORMIN (GLUCOPHAGE) 1,000 mg tablet 2 Sig: TAKE ONE TABLET BY MOUTH TWICE DAILY WITH MEALS Class: Normal  
 Pharmacy: [address] [place], [place] [address]franky Barga Ph #: [phone]  
 lisinopril (PRINIVIL, ZESTRIL) 20 mg tablet 2 Sig: TAKE ONE TABLET BY MOUTH ONCE DAILY Class: Normal  
 Pharmacy: [address] [place], [place] [address]franky Braga Ph #: [phone]  
 pravastatin (PRAVACHOL) 20 mg tablet 2 Sig: TAKE ONE TABLET BY MOUTH NIGHTLY Class: Normal  
 Pharmacy: [address] [place], [place] [address]franky Braga Ph #: [phone]  
 ergocalciferol (ERGOCALCIFEROL) 50,000 unit capsule 1 Sig: Take 1 Cap by mouth every seven (7) days. Class: Normal  
 Pharmacy: [address] [place], [place] [address]franky Braga Ph #: [phone] Route: Oral  
  
Follow-up Instructions Return in about 1 month (around 7/14/2018), or if symptoms worsen or fail to improve. Patient Instructions Please contact our office if you have any questions about your visit today. Introducing [facility]!    
 [facility] introduces pfwaterworks patient portal. Now you can access parts of your medical record, email your doctor's office, and request medication refills online. 1. In your internet browser, go to [email or web address] 2. Click on the First Time User? Click Here link in the Sign In box. You will see the New Member Sign Up page. 3. Enter your [employer] Access Code exactly as it appears below. You will not need to use this code after youve completed the sign-up process. If you do not sign up before the expiration date, you must request a new code. · [employer] Access Code: [email or web address] Expires: 9/12/2018  2:41 PM 
 
4. Enter the last four digits of your Social Security Number (xxxx) and Date of Birth (mm/dd/yyyy) as indicated and click Submit. You will be taken to the next sign-up page. 5. Create a [employer] ID. This will be your [employer] login ID and cannot be changed, so think of one that is secure and easy to remember. 6. Create a [employer] password. You can change your password at any time. 7. Enter your Password Reset Question and Answer. This can be used at a later time if you forget your password. 8. Enter your e-mail address. You will receive e-mail notification when new information is available in [address] 9. Click Sign Up. You can now view and download portions of your medical record. 10. Click the Download Summary menu link to download a portable copy of your medical information. If you have questions, please visit the Frequently Asked Questions section of the [employer] website. Remember, [employer] is NOT to be used for urgent needs. For medical emergencies, dial 911. Now available from your iPhone and Android! Please provide this summary of care documentation to your next provider. Your primary care clinician is listed as Amy Belcher. If you have any questions after today's visit, please call [phone].

## 2018-06-14 NOTE — PROGRESS NOTES
LIZETTE Alatorre is a 33 y.o. male  Chief Complaint   Patient presents with    Diabetes    Hypertension    Cholesterol Problem     high chol    Vitamin D Deficiency     Reports he has no new concerns. Reports he is just here for medication refills. Denies chest pain, shortness of breath, leg or ankle edema. Reports he is out of his blood pressure medication and his cholesterol medication. Reports 2 metformin left. Denies any hypo or hyperglycemic episodes. Past Medical History  Past Medical History:   Diagnosis Date    [facility] ([facility]     Hypercholesterolemia     Hypertension        Surgical History  No past surgical history on file. Medications  Current Outpatient Prescriptions   Medication Sig Dispense Refill    metFORMIN (GLUCOPHAGE) 1,000 mg tablet TAKE ONE TABLET BY MOUTH TWICE DAILY WITH MEALS 60 Tab 2    lisinopril (PRINIVIL, ZESTRIL) 20 mg tablet TAKE ONE TABLET BY MOUTH ONCE DAILY 30 Tab 2    pravastatin (PRAVACHOL) 20 mg tablet TAKE ONE TABLET BY MOUTH NIGHTLY 30 Tab 2    ergocalciferol (ERGOCALCIFEROL) 50,000 unit capsule Take 1 Cap by mouth every seven (7) days. 12 Cap 1       Allergies  No Known Allergies    Family History  Family History   Problem Relation Age of Onset    Hypertension Mother     Cancer Father      colon    Hypertension Father        Social History  Social History     Social History    Marital status: SINGLE     Spouse name: N/A    Number of children: N/A    Years of education: N/A     Occupational History    Not on file.      Social History Main Topics    Smoking status: Never Smoker    Smokeless tobacco: Former User    Alcohol use 1.2 oz/week     2 Cans of beer per week    Drug use: No    Sexual activity: Yes     Other Topics Concern    Not on file     Social History Narrative       Problem List  Patient Active Problem List   Diagnosis Code    Type 2 diabetes mellitus without complication, without long-term current use of insulin ([facility] E11.9    Preventive measure Z29.9    Essential hypertension with goal blood pressure less than 130/80 I10    Vitamin D deficiency E55.9    Obesity, morbid (HCC) E66.01       Review of Systems  Review of Systems   Constitutional: Negative for malaise/fatigue. Eyes: Negative for blurred vision. Respiratory: Negative for shortness of breath. Cardiovascular: Negative for chest pain, palpitations and leg swelling. Gastrointestinal: Negative for nausea and vomiting. Genitourinary: Negative. Skin: Negative for itching and rash. Neurological: Negative for dizziness. Vital Signs  Vitals:    06/14/18 1357 06/14/18 1404   BP: 154/87 160/90   Pulse: (!) 59    Resp: 20    Temp: 98.5 °F (36.9 °C)    TempSrc: Oral    Weight: (!) 368 lb (166.9 kg)    Height: 5' 10\" (1.778 m)    PainSc:   0 - No pain        Physical Exam  Physical Exam   Constitutional: He is oriented to person, place, and time. HENT:   Mouth/Throat: Oropharynx is clear and moist.   Cardiovascular: Normal rate, regular rhythm and normal heart sounds. Pulmonary/Chest: Effort normal and breath sounds normal.   Abdominal: Soft. Bowel sounds are normal. He exhibits no distension. There is no tenderness. Neurological: He is alert and oriented to person, place, and time. Psychiatric: He has a normal mood and affect. His behavior is normal.   Vitals reviewed.       Diagnostics  Orders Placed This Encounter    metFORMIN (GLUCOPHAGE) 1,000 mg tablet     Sig: TAKE ONE TABLET BY MOUTH TWICE DAILY WITH MEALS     Dispense:  60 Tab     Refill:  2     Please consider 90 day supplies to promote better adherence    lisinopril (PRINIVIL, ZESTRIL) 20 mg tablet     Sig: TAKE ONE TABLET BY MOUTH ONCE DAILY     Dispense:  30 Tab     Refill:  2     Please consider 90 day supplies to promote better adherence    pravastatin (PRAVACHOL) 20 mg tablet     Sig: TAKE ONE TABLET BY MOUTH NIGHTLY     Dispense:  30 Tab     Refill:  2     Please consider 90 day supplies to promote better adherence    ergocalciferol (ERGOCALCIFEROL) 50,000 unit capsule     Sig: Take 1 Cap by mouth every seven (7) days. Dispense:  12 Cap     Refill:  1       Results  Results for orders placed or performed during the hospital encounter of 04/12/17   LIPID CASCADE W/REFLEX APO B   Result Value Ref Range    Cholesterol, total 147 100 - 199 mg/dL    HDL Cholesterol 34 (L) >39 mg/dL    LDL/HDL Ratio 2.5 0.0 - 3.6 ratio units    Non-HDL Cholesterol 113 0 - 129 mg/dL    Triglyceride 145 0 - 149 mg/dL    LDL, calculated 84 0 - 99 mg/dL   CBC WITH AUTOMATED DIFF   Result Value Ref Range    WBC 6.4 4.6 - 13.2 K/uL    RBC 4.41 (L) 4.70 - 5.50 M/uL    HGB 12.1 (L) 13.0 - 16.0 g/dL    HCT 38.4 36.0 - 48.0 %    MCV 87.1 74.0 - 97.0 FL    MCH 27.4 24.0 - 34.0 PG    MCHC 31.5 31.0 - 37.0 g/dL    RDW 14.3 11.6 - 14.5 %    PLATELET 355 007 - 545 K/uL    MPV 10.6 9.2 - 11.8 FL    NEUTROPHILS 40 40 - 73 %    LYMPHOCYTES 47 21 - 52 %    MONOCYTES 8 3 - 10 %    EOSINOPHILS 5 0 - 5 %    BASOPHILS 0 0 - 2 %    ABS. NEUTROPHILS 2.6 1.8 - 8.0 K/UL    ABS. LYMPHOCYTES 3.0 0.9 - 3.6 K/UL    ABS. MONOCYTES 0.5 0.05 - 1.2 K/UL    ABS. EOSINOPHILS 0.3 0.0 - 0.4 K/UL    ABS. BASOPHILS 0.0 0.0 - 0.06 K/UL    DF AUTOMATED     METABOLIC PANEL, COMPREHENSIVE   Result Value Ref Range    Sodium 139 136 - 145 mmol/L    Potassium 4.1 3.5 - 5.5 mmol/L    Chloride 103 100 - 108 mmol/L    CO2 27 21 - 32 mmol/L    Anion gap 9 3.0 - 18 mmol/L    Glucose 97 74 - 99 mg/dL    BUN 14 7.0 - 18 MG/DL    Creatinine 1.02 0.6 - 1.3 MG/DL    BUN/Creatinine ratio 14 12 - 20      GFR est AA >60 >60 ml/min/1.73m2    GFR est non-AA >60 >60 ml/min/1.73m2    Calcium 8.8 8.5 - 10.1 MG/DL    Bilirubin, total 0.3 0.2 - 1.0 MG/DL    ALT (SGPT) 52 16 - 61 U/L    AST (SGOT) 33 15 - 37 U/L    Alk.  phosphatase 41 (L) 45 - 117 U/L    Protein, total 7.1 6.4 - 8.2 g/dL    Albumin 3.5 3.4 - 5.0 g/dL    Globulin 3.6 2.0 - 4.0 g/dL    A-G Ratio 1.0 0.8 - 1.7     TSH 3RD GENERATION   Result Value Ref Range    TSH 0.75 0.36 - 3.74 uIU/mL   T4, FREE   Result Value Ref Range    T4, Free 1.3 0.7 - 1.5 NG/DL   SED RATE (ESR)   Result Value Ref Range    Sed rate, automated 18 (H) 0 - 15 mm/hr   HEMOGLOBIN A1C WITH EAG   Result Value Ref Range    Hemoglobin A1c 7.1 (H) 4.2 - 5.6 %    Est. average glucose 157 mg/dL   VITAMIN D, 25 HYDROXY   Result Value Ref Range    Vitamin D 25-Hydroxy 8.3 (L) 30 - 100 ng/mL   MAGNESIUM   Result Value Ref Range    Magnesium 1.8 1.6 - 2.6 mg/dL   PHOSPHORUS   Result Value Ref Range    Phosphorus 3.1 2.5 - 4.9 MG/DL   APOLIPOPROTEIN B   Result Value Ref Range    Apolipoprotein B 96 52 - 135 mg/dL       Assessment and Plan  Diagnoses and all orders for this visit:    1. Type 2 diabetes mellitus without complication, without long-term current use of insulin (HCC)  -     metFORMIN (GLUCOPHAGE) 1,000 mg tablet; TAKE ONE TABLET BY MOUTH TWICE DAILY WITH MEALS  -     lisinopril (PRINIVIL, ZESTRIL) 20 mg tablet; TAKE ONE TABLET BY MOUTH ONCE DAILY  -     pravastatin (PRAVACHOL) 20 mg tablet; TAKE ONE TABLET BY MOUTH NIGHTLY    2. Essential hypertension with goal blood pressure less than 130/80  -     lisinopril (PRINIVIL, ZESTRIL) 20 mg tablet; TAKE ONE TABLET BY MOUTH ONCE DAILY  -     pravastatin (PRAVACHOL) 20 mg tablet; TAKE ONE TABLET BY MOUTH NIGHTLY    3. Hypercholesterolemia  -     pravastatin (PRAVACHOL) 20 mg tablet; TAKE ONE TABLET BY MOUTH NIGHTLY    4. Vitamin D deficiency  -     ergocalciferol (ERGOCALCIFEROL) 50,000 unit capsule; Take 1 Cap by mouth every seven (7) days. After care summary printed and reviewed with patient. Plan reviewed with patient. Questions answered. Patient verbalized understanding of plan and is in agreement with plan. Patient to follow up in one month with his PCP or earlier if symptoms worsen. Will re-evaluate blood pressure at that time.      JOE Burch

## 2018-07-05 ENCOUNTER — OFFICE VISIT (OUTPATIENT)
Dept: FAMILY MEDICINE CLINIC | Age: 33
End: 2018-07-05

## 2018-07-05 VITALS
HEART RATE: 90 BPM | TEMPERATURE: 97.6 F | WEIGHT: 315 LBS | RESPIRATION RATE: 16 BRPM | BODY MASS INDEX: 45.1 KG/M2 | HEIGHT: 70 IN | SYSTOLIC BLOOD PRESSURE: 145 MMHG | DIASTOLIC BLOOD PRESSURE: 89 MMHG

## 2018-07-05 DIAGNOSIS — E55.9 VITAMIN D DEFICIENCY: ICD-10-CM

## 2018-07-05 DIAGNOSIS — E66.01 OBESITY, MORBID (HCC): ICD-10-CM

## 2018-07-05 DIAGNOSIS — E11.9 TYPE 2 DIABETES MELLITUS WITHOUT COMPLICATION, WITHOUT LONG-TERM CURRENT USE OF INSULIN (HCC): Primary | ICD-10-CM

## 2018-07-05 RX ORDER — INSULIN PUMP SYRINGE, 3 ML
EACH MISCELLANEOUS
Qty: 1 KIT | Refills: 0 | Status: SHIPPED | OUTPATIENT
Start: 2018-07-05

## 2018-07-05 NOTE — PATIENT INSTRUCTIONS
Please contact our office if you have any questions about your visit today. 1.) Please check your blood sugars every other day after largest meal.     2.) Goal is to check blood sugar after meals is 150-170    3.) Return in 2 weeks for follow up. Home Blood Glucose Test: About This Test  What is it? A home blood glucose test measures the amount of a type of sugar, called glucose, in your blood. Why is this test done? People who have diabetes need to check the amount of glucose in their blood. A home blood glucose test is an easy way to test your blood at home or when you are away from home. The results help you know when to take action to keep your blood glucose levels in a target range. How can you prepare for the test?  · Check the expiration date on the bottle of testing strips. Do not use test strips that have . · Match the code number on the testing strips bottle with the number on the meter. If the numbers do not match, follow the directions with the meter for changing the code number. What happens before the test?  The supplies you will need for testing blood glucose include:  · A blood glucose meter. · Testing strips. These are made to be used with a specific model of meter. · Sugar control solutions. Some meters require a specific solution. Many new meters are made to operate without a control solution. · Short needles called lancets for pricking your skin. · A pen-sized espinal for the lancet (lancet device), which positions the lancet and controls how deeply it goes into your skin. · Clean cotton balls. These are used to stop the bleeding from the testing site. What happens during the test?  A home blood glucose test involves pricking your finger, palm, or forearm with a lancet to collect a drop of blood. The blood drop is placed on a test strip, which you insert into the blood glucose meter.  The instructions for testing are slightly different for each blood glucose meter model. Follow the instructions that came with your meter. · Wash your hands with warm, soapy water. Dry them well with a clean towel. You may also use an alcohol wipe to clean your finger or other site, but make sure your hands are dry before the test.  · Insert a clean lancet into the lancet device. · Remove a test strip from the test strip bottle. Replace the lid immediately to keep moisture away from the other strips. · Follow the instructions that came with your meter to get it ready. · Use the lancet device to stick the side of your fingertip with the lancet. Do not stick the tip of your finger. Some blood sugar meters use lancet devices that take the blood sample from other sites, such as the palm of the hand or the forearm. But the finger is usually the most accurate place to test blood sugar. · Put a drop of blood on the correct spot on the test strip. · Apply pressure with a clean cotton ball to stop the bleeding. · Follow the directions that came with the meter to get the results. · Write down the results and the time that you tested your blood. Some meters will store the results for you. What else should you know about the test?  The American Diabetes Association (ADA) recommends that you stay within the following blood glucose level ranges. But depending on your health, you and your doctor may set a different range for you. For nonpregnant adults with diabetes:  · 80 milligrams per deciliter (mg/dL) to 130 mg/dL before a meal  · Less than 180 mg/dL 1 to 2 hours after a meal  For women who have diabetes related to pregnancy (gestational diabetes):  · 95 mg/dL or less before breakfast  · 120 to 140 mg/dL (or lower) 1 to 2 hours after a meal  How long does the test take? · The blood glucose meter will show the results of the test in a minute or less. Where can you learn more? Go to http://christine-titus.info/.   Enter I697 in the search box to learn more about \"Home Blood Glucose Test: About This Test.\"  Current as of: March 13, 2017  Content Version: 11.4  © 1364-9840 [employer]. Care instructions adapted under license by [employer] (which disclaims liability or warranty for this information). If you have questions about a medical condition or this instruction, always ask your healthcare professional. [place] [address] any warranty or liability for your use of this information.

## 2018-07-05 NOTE — PROGRESS NOTES
History and Physical    Patient: Mohsen Gordon MRN: [id number]  SSN: [id number]    YOB: 1985  Age: 33 y.o. Sex: male      Subjective:      Mohsen Gordon is a 33 y.o. male who is here for follow up. The patient mentions that he does not check his blood sugars regularly. He mentions that he works out every day. He states that he works out about an hour to an hour and a half. He does use the bike and treadmill as well as the elliptical. He mentions that he will alternate days for the weight training and cardio. Visit from 4/13/17   The patient would like to review his labs. Patient does not mention any chest pain, abdominal pain or shortness of breath. Past Medical History:   Diagnosis Date    Diabetes ([facility]     Hypercholesterolemia     Hypertension          Prior to Admission medications    Medication Sig Start Date End Date Taking? Authorizing Provider   metFORMIN (GLUCOPHAGE) 1,000 mg tablet TAKE ONE TABLET BY MOUTH TWICE DAILY WITH MEALS 6/14/18  Yes Ana Laura Ramirez NP   lisinopril (PRINIVIL, ZESTRIL) 20 mg tablet TAKE ONE TABLET BY MOUTH ONCE DAILY 6/14/18  Yes Ana Laura Ramirez NP   pravastatin (PRAVACHOL) 20 mg tablet TAKE ONE TABLET BY MOUTH NIGHTLY 6/14/18  Yes Ana Laura Ramirez NP   ergocalciferol (ERGOCALCIFEROL) 50,000 unit capsule Take 1 Cap by mouth every seven (7) days. 6/14/18  Yes Ana Laura Ramirez NP        No Known Allergies    Review of Systems:  Pertinent ROS noted in HPI    Objective:     Visit Vitals    /89 (BP 1 Location: Right arm, BP Patient Position: Sitting)    Pulse 90    Temp 97.6 °F (36.4 °C) (Oral)    Resp 16    Ht 5' 10\" (1.778 m)    Wt (!) 372 lb (168.7 kg)    BMI 53.38 kg/m2       Physical Exam:  GENERAL: alert, cooperative, no distress, appears stated age, morbidly obese  EYE: conjunctivae/corneas clear. PERRL, EOM's intact.  Fundi benign  LYMPHATIC: Cervical, supraclavicular, and axillary nodes normal.   THROAT & NECK: normal and no erythema or exudates noted. LUNG: clear to auscultation bilaterally  HEART: regular rate and rhythm, S1, S2 normal, no murmur, click, rub or gallop  ABDOMEN: soft, non-tender. Bowel sounds normal. No masses,  no organomegaly, abnormal findings:  obese  EXTREMITIES:  extremities normal, atraumatic, no cyanosis or edema    Labs:     Lab Results   Component Value Date/Time    Hemoglobin A1c 7.1 (H) 04/12/2017 10:55 AM     Lab Results   Component Value Date/Time    Sodium 139 04/12/2017 10:55 AM    Potassium 4.1 04/12/2017 10:55 AM    Chloride 103 04/12/2017 10:55 AM    CO2 27 04/12/2017 10:55 AM    Anion gap 9 04/12/2017 10:55 AM    Glucose 97 04/12/2017 10:55 AM    BUN 14 04/12/2017 10:55 AM    Creatinine 1.02 04/12/2017 10:55 AM    BUN/Creatinine ratio 14 04/12/2017 10:55 AM    GFR est AA >60 04/12/2017 10:55 AM    GFR est non-AA >60 04/12/2017 10:55 AM    Calcium 8.8 04/12/2017 10:55 AM    Bilirubin, total 0.3 04/12/2017 10:55 AM    AST (SGOT) 33 04/12/2017 10:55 AM    Alk. phosphatase 41 (L) 04/12/2017 10:55 AM    Protein, total 7.1 04/12/2017 10:55 AM    Albumin 3.5 04/12/2017 10:55 AM    Globulin 3.6 04/12/2017 10:55 AM    A-G Ratio 1.0 04/12/2017 10:55 AM    ALT (SGPT) 52 04/12/2017 10:55 AM     Lab Results   Component Value Date/Time    Cholesterol, total 147 04/12/2017 10:55 AM    HDL Cholesterol 34 (L) 04/12/2017 10:55 AM    LDL, calculated 84 04/12/2017 10:55 AM    Triglyceride 145 04/12/2017 10:55 AM     Lab Results   Component Value Date/Time    WBC 6.4 04/12/2017 10:55 AM    HGB 12.1 (L) 04/12/2017 10:55 AM    HCT 38.4 04/12/2017 10:55 AM    PLATELET 615 37/26/6375 10:55 AM    MCV 87.1 04/12/2017 10:55 AM             Assessment:       1.) Non-Insulin Dependent Diabetes Mellitus Type 2: Blood glucose testing supplies were ordered. He was advised to check his BS every other day. His non fasting goal is 150-170 mg/dL. He will continue on Metformin 2,000 mg a day. HgbA1C ordered.       2.) Vitamin D Deficiency: Patient will continue on once weekly Vitamin D 50,000 units. 3.) Hyperlipidemia: Patient will continue on pravastatin. Lipid panel ordered. 4.) Essential Hypertension: Patient will continue on lisinopril. 5.)  Preventive: Labs ordered as noted below. I personally reviewed and discussed labs with the patient. Patient will return in 2 weeks for follow up on blood sugars.        Plan:     Orders Placed This Encounter    HEMOGLOBIN A1C WITH EAG    METABOLIC PANEL, COMPREHENSIVE    LIPID PANEL    Blood-Glucose Meter monitoring kit    lancets 32 gauge misc    glucose blood VI test strips (BLOOD GLUCOSE TEST) strip           Signed By: Jorden Worrell DO     July 5, 2018

## 2018-07-05 NOTE — PROGRESS NOTES
Chief Complaint   Patient presents with    Hypertension    Diabetes    Obesity     1. Have you been to the ER, urgent care clinic since your last visit? Hospitalized since your last visit? No    2. Have you seen or consulted any other health care providers outside of the [address] [place] [address] since your last visit? Include any pap smears or colon screening.  No

## 2018-07-05 NOTE — MR AVS SNAPSHOT
Shira Galeano 
 
 
 [address] [address] [address] [address] [phone] Patient: Daisha Zuniga MRN: [id number] :1985 Visit Information Date & Time Provider Department Dept. Phone Encounter #  
 2018  9:15 AM Margot Sher 77 [id number] Follow-up Instructions Return in about 2 weeks (around 2018) for Follow Up . Upcoming Health Maintenance Date Due MICROALBUMIN Q1 1996 EYE EXAM RETINAL OR DILATED Q1 1996 Pneumococcal 19-64 Medium Risk (1 of 1 - PPSV23) 2005 HEMOGLOBIN A1C Q6M 10/12/2017 LIPID PANEL Q1 2018 FOOT EXAM Q1 2018 Influenza Age 5 to Adult 2018 DTaP/Tdap/Td series (2 - Td) 3/5/2024 Allergies as of 2018  Review Complete On: 2018 By: Smarty Ants Hardeep, NP No Known Allergies Current Immunizations  Never Reviewed No immunizations on file. Not reviewed this visit You Were Diagnosed With   
  
 Codes Comments Type 2 diabetes mellitus without complication, without long-term current use of insulin (HCC)    -  Primary ICD-10-CM: E11.9 ICD-9-CM: 250.00 Vitamin D deficiency     ICD-10-CM: E55.9 ICD-9-CM: 268.9 Obesity, morbid ([facility] [address]     ICD-10-CM: E66.01 
ICD-9-CM: 278.01 Vitals BP Pulse Temp Resp Height(growth percentile) Weight(growth percentile) 145/89 (BP 1 Location: Right arm, BP Patient Position: Sitting) 90 97.6 °F (36.4 °C) (Oral) 16 5' 10\" (1.778 m) (!) 372 lb (168.7 kg) BMI Smoking Status 53.38 kg/m2 Never Smoker BMI and BSA Data Body Mass Index Body Surface Area  
 53.38 kg/m 2 2.89 m 2 Preferred Pharmacy Pharmacy Name Phone [address] [phone] Your Updated Medication List  
  
   
 This list is accurate as of 7/5/18 10:05 AM.  Always use your most recent med list.  
  
  
  
  
 Blood-Glucose Meter monitoring kit Check blood sugar every other day after largest meal of the day. Goal is 150-170. ergocalciferol 50,000 unit capsule Commonly known as:  ERGOCALCIFEROL Take 1 Cap by mouth every seven (7) days. glucose blood VI test strips strip Commonly known as:  blood glucose test  
Check blood sugar every other day after largest meal of the day. Goal is 150-170. lancets 32 gauge Misc Check blood sugar every other day after largest meal of the day. Goal is 150-170. lisinopril 20 mg tablet Commonly known as:  PRINIVIL, ZESTRIL  
TAKE ONE TABLET BY MOUTH ONCE DAILY  
  
 metFORMIN 1,000 mg tablet Commonly known as:  GLUCOPHAGE  
TAKE ONE TABLET BY MOUTH TWICE DAILY WITH MEALS  
  
 pravastatin 20 mg tablet Commonly known as:  PRAVACHOL  
TAKE ONE TABLET BY MOUTH NIGHTLY Prescriptions Sent to Pharmacy Refills Blood-Glucose Meter monitoring kit 0 Sig: Check blood sugar every other day after largest meal of the day. Goal is 150-170. Class: Normal  
 Pharmacy: [facility] [address] Ph #: [phone]  
 lancets 28 gauge misc 3 Sig: Check blood sugar every other day after largest meal of the day. Goal is 150-170. Class: Normal  
 Pharmacy: [facility] [address],  [facility] Ph #: [phone]  
 glucose blood VI test strips (BLOOD GLUCOSE TEST) strip 3 Sig: Check blood sugar every other day after largest meal of the day. Goal is 150-170. Class: Normal  
 Pharmacy: [facility] [address],  [address] [place] [address] [facility] Ph #: [phone] Follow-up Instructions Return in about 2 weeks (around 7/19/2018) for Follow Up . To-Do List   
 07/05/2018 Lab:  HEMOGLOBIN A1C WITH EAG Around 10/03/2018 Lab: LIPID PANEL Around 10/03/2018 Lab:  METABOLIC PANEL, COMPREHENSIVE Patient Instructions Please contact our office if you have any questions about your visit today. 1.) Please check your blood sugars every other day after largest meal.  
 
2.) Goal is to check blood sugar after meals is 150-170 
 
3.) Return in 2 weeks for follow up. Home Blood Glucose Test: About This Test 
What is it? A home blood glucose test measures the amount of a type of sugar, called glucose, in your blood. Why is this test done? People who have diabetes need to check the amount of glucose in their blood. A home blood glucose test is an easy way to test your blood at home or when you are away from home. The results help you know when to take action to keep your blood glucose levels in a target range. How can you prepare for the test? 
· Check the expiration date on the bottle of testing strips. Do not use test strips that have . · Match the code number on the testing strips bottle with the number on the meter. If the numbers do not match, follow the directions with the meter for changing the code number. What happens before the test? 
The supplies you will need for testing blood glucose include: · A blood glucose meter. · Testing strips. These are made to be used with a specific model of meter. · Sugar control solutions. Some meters require a specific solution. Many new meters are made to operate without a control solution. · Short needles called lancets for pricking your skin. · A pen-sized espinal for the lancet (lancet device), which positions the lancet and controls how deeply it goes into your skin. · Clean cotton balls. These are used to stop the bleeding from the testing site. What happens during the test? 
A home blood glucose test involves pricking your finger, palm, or forearm with a lancet to collect a drop of blood.  The blood drop is placed on a test strip, which you insert into the blood glucose meter. The instructions for testing are slightly different for each blood glucose meter model. Follow the instructions that came with your meter. · Wash your hands with warm, soapy water. Dry them well with a clean towel. You may also use an alcohol wipe to clean your finger or other site, but make sure your hands are dry before the test. 
· Insert a clean lancet into the lancet device. · Remove a test strip from the test strip bottle. Replace the lid immediately to keep moisture away from the other strips. · Follow the instructions that came with your meter to get it ready. · Use the lancet device to stick the side of your fingertip with the lancet. Do not stick the tip of your finger. Some blood sugar meters use lancet devices that take the blood sample from other sites, such as the palm of the hand or the forearm. But the finger is usually the most accurate place to test blood sugar. · Put a drop of blood on the correct spot on the test strip. · Apply pressure with a clean cotton ball to stop the bleeding. · Follow the directions that came with the meter to get the results. · Write down the results and the time that you tested your blood. Some meters will store the results for you. What else should you know about the test? 
The American Diabetes Association (ADA) recommends that you stay within the following blood glucose level ranges. But depending on your health, you and your doctor may set a different range for you. For nonpregnant adults with diabetes: 
· 80 milligrams per deciliter (mg/dL) to 130 mg/dL before a meal 
· Less than 180 mg/dL 1 to 2 hours after a meal 
For women who have diabetes related to pregnancy (gestational diabetes): · 95 mg/dL or less before breakfast 
· 120 to 140 mg/dL (or lower) 1 to 2 hours after a meal 
How long does the test take? · The blood glucose meter will show the results of the test in a minute or less. Where can you learn more? Go to http://christine-titus.info/. Enter G985 in the search box to learn more about \"Home Blood Glucose Test: About This Test.\" Current as of: March 13, 2017 Content Version: 11.4 © 8129-6542 Healthwise, Incorporated. Care instructions adapted under license by "[employer] (which disclaims liability or warranty for this information). If you have questions about a medical condition or this instruction, always ask your healthcare professional. [place] [address] any warranty or liability for your use of this information. Introducing [facility] & HEALTH SERVICES! Oscarpatrice Juarez introduces [employer] patient portal. Now you can access parts of your medical record, email your doctor's office, and request medication refills online. 1. In your internet browser, go to [email or web address] 2. Click on the First Time User? Click Here link in the Sign In box. You will see the New Member Sign Up page. 3. Enter your [employer] Access Code exactly as it appears below. You will not need to use this code after youve completed the sign-up process. If you do not sign up before the expiration date, you must request a new code. · [employer] Access Code: [email or web address] Expires: 9/12/2018  2:41 PM 
 
4. Enter the last four digits of your Social Security Number (xxxx) and Date of Birth (mm/dd/yyyy) as indicated and click Submit. You will be taken to the next sign-up page. 5. Create a [employer] ID. This will be your [employer] login ID and cannot be changed, so think of one that is secure and easy to remember. 6. Create a [employer] password. You can change your password at any time. 7. Enter your Password Reset Question and Answer. This can be used at a later time if you forget your password. 8. Enter your e-mail address. You will receive e-mail notification when new information is available in [address] 9. Click Sign Up. You can now view and download portions of your medical record. 10. Click the Download Summary menu link to download a portable copy of your medical information. If you have questions, please visit the Frequently Asked Questions section of the [employer] website. Remember, [employer] is NOT to be used for urgent needs. For medical emergencies, dial 911. Now available from your iPhone and Android! Please provide this summary of care documentation to your next provider. Your primary care clinician is listed as [facility]. If you have any questions after today's visit, please call [phone].

## 2018-07-19 ENCOUNTER — HOSPITAL ENCOUNTER (OUTPATIENT)
Dept: LAB | Age: 33
Discharge: HOME | End: 2018-07-19
Payer: COMMERCIAL

## 2018-07-19 ENCOUNTER — OFFICE VISIT (OUTPATIENT)
Dept: FAMILY MEDICINE CLINIC | Age: 33
End: 2018-07-19

## 2018-07-19 VITALS
HEIGHT: 70 IN | TEMPERATURE: 97.5 F | BODY MASS INDEX: 45.1 KG/M2 | DIASTOLIC BLOOD PRESSURE: 85 MMHG | RESPIRATION RATE: 17 BRPM | WEIGHT: 315 LBS | HEART RATE: 77 BPM | SYSTOLIC BLOOD PRESSURE: 140 MMHG

## 2018-07-19 DIAGNOSIS — E66.01 OBESITY, MORBID (HCC): ICD-10-CM

## 2018-07-19 DIAGNOSIS — R30.0 DYSURIA: Primary | ICD-10-CM

## 2018-07-19 DIAGNOSIS — E55.9 VITAMIN D DEFICIENCY: ICD-10-CM

## 2018-07-19 DIAGNOSIS — E11.9 TYPE 2 DIABETES MELLITUS WITHOUT COMPLICATION, WITHOUT LONG-TERM CURRENT USE OF INSULIN (HCC): ICD-10-CM

## 2018-07-19 DIAGNOSIS — R30.0 DYSURIA: ICD-10-CM

## 2018-07-19 LAB
ALBUMIN SERPL-MCNC: 4 G/DL (ref 3.4–5)
ALBUMIN/GLOB SERPL: 1 {RATIO} (ref 0.8–1.7)
ALP SERPL-CCNC: 56 U/L (ref 45–117)
ALT SERPL-CCNC: 40 U/L (ref 16–61)
ANION GAP SERPL CALC-SCNC: 7 MMOL/L (ref 3–18)
APPEARANCE UR: CLEAR
AST SERPL-CCNC: 21 U/L (ref 15–37)
BACTERIA URNS QL MICRO: NEGATIVE /HPF
BILIRUB SERPL-MCNC: 0.4 MG/DL (ref 0.2–1)
BILIRUB UR QL: NEGATIVE
BUN SERPL-MCNC: 16 MG/DL (ref 7–18)
BUN/CREAT SERPL: 15 (ref 12–20)
CALCIUM SERPL-MCNC: 9.7 MG/DL (ref 8.5–10.1)
CHLORIDE SERPL-SCNC: 107 MMOL/L (ref 100–108)
CHOLEST SERPL-MCNC: 208 MG/DL
CO2 SERPL-SCNC: 29 MMOL/L (ref 21–32)
COLOR UR: YELLOW
CREAT SERPL-MCNC: 1.05 MG/DL (ref 0.6–1.3)
EPITH CASTS URNS QL MICRO: ABNORMAL /LPF (ref 0–5)
EST. AVERAGE GLUCOSE BLD GHB EST-MCNC: 143 MG/DL
GLOBULIN SER CALC-MCNC: 3.9 G/DL (ref 2–4)
GLUCOSE SERPL-MCNC: 118 MG/DL (ref 74–99)
GLUCOSE UR STRIP.AUTO-MCNC: NEGATIVE MG/DL
HBA1C MFR BLD: 6.6 % (ref 4.2–5.6)
HDLC SERPL-MCNC: 40 MG/DL (ref 40–60)
HDLC SERPL: 5.2 {RATIO} (ref 0–5)
HGB UR QL STRIP: NEGATIVE
KETONES UR QL STRIP.AUTO: NEGATIVE MG/DL
LDLC SERPL CALC-MCNC: 139.6 MG/DL (ref 0–100)
LEUKOCYTE ESTERASE UR QL STRIP.AUTO: ABNORMAL
LIPID PROFILE,FLP: ABNORMAL
NITRITE UR QL STRIP.AUTO: NEGATIVE
PH UR STRIP: 5 [PH] (ref 5–8)
POTASSIUM SERPL-SCNC: 4.8 MMOL/L (ref 3.5–5.5)
PROT SERPL-MCNC: 7.9 G/DL (ref 6.4–8.2)
PROT UR STRIP-MCNC: NEGATIVE MG/DL
RBC #/AREA URNS HPF: NEGATIVE /HPF (ref 0–5)
SODIUM SERPL-SCNC: 143 MMOL/L (ref 136–145)
SP GR UR REFRACTOMETRY: 1.02 (ref 1–1.03)
TRIGL SERPL-MCNC: 142 MG/DL (ref ?–150)
URATE CRY URNS QL MICRO: ABNORMAL
UROBILINOGEN UR QL STRIP.AUTO: 0.2 EU/DL (ref 0.2–1)
VLDLC SERPL CALC-MCNC: 28.4 MG/DL
WBC URNS QL MICRO: ABNORMAL /HPF (ref 0–4)

## 2018-07-19 PROCEDURE — 81001 URINALYSIS AUTO W/SCOPE: CPT | Performed by: INTERNAL MEDICINE

## 2018-07-19 PROCEDURE — 36415 COLL VENOUS BLD VENIPUNCTURE: CPT | Performed by: INTERNAL MEDICINE

## 2018-07-19 PROCEDURE — 82043 UR ALBUMIN QUANTITATIVE: CPT | Performed by: INTERNAL MEDICINE

## 2018-07-19 PROCEDURE — 83036 HEMOGLOBIN GLYCOSYLATED A1C: CPT | Performed by: INTERNAL MEDICINE

## 2018-07-19 PROCEDURE — 80053 COMPREHEN METABOLIC PANEL: CPT | Performed by: INTERNAL MEDICINE

## 2018-07-19 PROCEDURE — 80061 LIPID PANEL: CPT | Performed by: INTERNAL MEDICINE

## 2018-07-19 NOTE — PROGRESS NOTES
History and Physical    Patient: Maggy Sousa MRN: [id number]  SSN: [id number]    YOB: 1985  Age: 33 y.o. Sex: male      Subjective:      Maggy Sousa is a 33 y.o. male who is here for follow up. The patient mentions that his bladder has been bothering him for about a week. He states that he has been using cranberry juice for about a week and then it resolved and would come back. He also mentions that it is not painful when he urinates. He states that his urine is not discolored this time. He has had UTI's in the past and they have not been like this. In regard to his blood sugar it was in the 200's last night. He mentions that his average blood sugars have been in the 200 range postprandially. He states that he has been using cinnamon as well. He states that he is interested in getting some information on meal preparation. Visit from July 5, 2018  The patient mentions that he does not check his blood sugars regularly. He mentions that he works out every day. He states that he works out about an hour to an hour and a half. He does use the bike and treadmill as well as the elliptical. He mentions that he will alternate days for the weight training and cardio. Visit from 4/13/17   The patient would like to review his labs. Patient does not mention any chest pain, abdominal pain or shortness of breath. Past Medical History:   Diagnosis Date    Diabetes ([address]     Hypercholesterolemia     Hypertension          Prior to Admission medications    Medication Sig Start Date End Date Taking? Authorizing Provider   Blood-Glucose Meter monitoring kit Check blood sugar every other day after largest meal of the day. Goal is 150-170. 7/5/18  Yes Ana Luisa Byrne DO   lancets 32 gauge misc Check blood sugar every other day after largest meal of the day.  Goal is 150-170. 7/5/18  Yes Ana Luisa Byrne DO   glucose blood VI test strips (BLOOD GLUCOSE TEST) strip Check blood sugar every other day after largest meal of the day. Goal is 150-170. 7/5/18  Yes Ana Luisa Byrne, DO   metFORMIN (GLUCOPHAGE) 1,000 mg tablet TAKE ONE TABLET BY MOUTH TWICE DAILY WITH MEALS 6/14/18  Yes Qiana Resendez, NP   lisinopril (PRINIVIL, ZESTRIL) 20 mg tablet TAKE ONE TABLET BY MOUTH ONCE DAILY 6/14/18  Yes Qiana Resendez, NP   pravastatin (PRAVACHOL) 20 mg tablet TAKE ONE TABLET BY MOUTH NIGHTLY 6/14/18  Yes Qiana Resendez, NP   ergocalciferol (ERGOCALCIFEROL) 50,000 unit capsule Take 1 Cap by mouth every seven (7) days. 6/14/18  Yes Qiana Resendez, NP        No Known Allergies    Review of Systems:  Pertinent ROS noted in HPI    Objective:     Visit Vitals    /85 (BP 1 Location: Left arm, BP Patient Position: Sitting)    Pulse 77    Temp 97.5 °F (36.4 °C) (Oral)    Resp 17    Ht 5' 10\" (1.778 m)    Wt (!) 368 lb (166.9 kg)    BMI 52.8 kg/m2       Physical Exam:  GENERAL: alert, cooperative, no distress, appears stated age, morbidly obese  EYE: conjunctivae/corneas clear. PERRL, EOM's intact. Fundi benign  LYMPHATIC: Cervical, supraclavicular, and axillary nodes normal.   THROAT & NECK: normal and no erythema or exudates noted. LUNG: clear to auscultation bilaterally  HEART: regular rate and rhythm, S1, S2 normal, no murmur, click, rub or gallop  ABDOMEN: soft, non-tender. Bowel sounds normal. No masses,  no organomegaly, abnormal findings:  obese  EXTREMITIES:  extremities normal, atraumatic, no cyanosis or edema    Labs:     Labs ordered as noted below      Assessment:     1.) Dysuria: Patient ordered urinalysis and microalbumin. Patient placed on Cipro due to positive urinalysis. 2.) Non-Insulin Dependent Diabetes Mellitus Type 2: HgbA1C drawn in the office today. Patient was given information on proper diet, including the 1500 and 1200 calorie meal plan. He will continue on Metformin 2,000 mg a day. Patient will return in 1 week for follow up on labs.        Plan:     Orders Placed This Encounter    URINALYSIS W/ RFLX MICROSCOPIC    MICROALBUMIN, UR, RAND W/ MICROALB/CREAT RATIO           Signed By: Farheen Hodges DO     July 19, 2018

## 2018-07-19 NOTE — PROGRESS NOTES
Chief Complaint   Patient presents with    Hypertension    Diabetes    Obesity     1. Have you been to the ER, urgent care clinic since your last visit? Hospitalized since your last visit? No    2. Have you seen or consulted any other health care providers outside of the [address] [place] [address] since your last visit? Include any pap smears or colon screening.  No

## 2018-07-19 NOTE — MR AVS SNAPSHOT
[address] [address] [address] [address] [phone] Patient: Kvng Del Toro MRN: [id number] :1985 Visit Information Date & Time Provider Department Dept. Phone Encounter #  
 2018  8:45 AM Margot Price 77 [id number] Follow-up Instructions Return in about 7 days (around 2018) for Follow up . Upcoming Health Maintenance Date Due MICROALBUMIN Q1 1996 EYE EXAM RETINAL OR DILATED Q1 1996 Pneumococcal 19-64 Medium Risk (1 of 1 - PPSV23) 2005 HEMOGLOBIN A1C Q6M 10/12/2017 LIPID PANEL Q1 2018 FOOT EXAM Q1 2018 Influenza Age 5 to Adult 2018 DTaP/Tdap/Td series (2 - Td) 3/5/2024 Allergies as of 2018  Review Complete On:  By: Davonte Sherman. Dipak Neal LPN No Known Allergies Current Immunizations  Never Reviewed No immunizations on file. Not reviewed this visit You Were Diagnosed With   
  
 Codes Comments Type 2 diabetes mellitus without complication, without long-term current use of insulin (HCC)    -  Primary ICD-10-CM: E11.9 ICD-9-CM: 250.00 Dysuria     ICD-10-CM: R30.0 ICD-9-CM: 204. 1 Vitals BP Pulse Temp Resp Height(growth percentile) Weight(growth percentile) 140/85 (BP 1 Location: Left arm, BP Patient Position: Sitting) 77 97.5 °F (36.4 °C) (Oral) 17 5' 10\" (1.778 m) (!) 368 lb (166.9 kg) BMI Smoking Status 52.8 kg/m2 Never Smoker BMI and BSA Data Body Mass Index Body Surface Area  
 52.8 kg/m 2 2.87 m 2 Preferred Pharmacy Pharmacy Name Phone [place] [address] [address] [facility] [phone] Your Updated Medication List  
  
   
This list is accurate as of 18  9:26 AM.  Always use your most recent med list.  
  
  
  
  
 Blood-Glucose Meter monitoring kit Check blood sugar every other day after largest meal of the day. Goal is 150-170. ergocalciferol 50,000 unit capsule Commonly known as:  ERGOCALCIFEROL Take 1 Cap by mouth every seven (7) days. glucose blood VI test strips strip Commonly known as:  blood glucose test  
Check blood sugar every other day after largest meal of the day. Goal is 150-170. lancets 32 gauge Misc Check blood sugar every other day after largest meal of the day. Goal is 150-170. lisinopril 20 mg tablet Commonly known as:  PRINIVIL, ZESTRIL  
TAKE ONE TABLET BY MOUTH ONCE DAILY  
  
 metFORMIN 1,000 mg tablet Commonly known as:  GLUCOPHAGE  
TAKE ONE TABLET BY MOUTH TWICE DAILY WITH MEALS  
  
 pravastatin 20 mg tablet Commonly known as:  PRAVACHOL  
TAKE ONE TABLET BY MOUTH NIGHTLY Follow-up Instructions Return in about 7 days (around 7/26/2018) for Follow up . To-Do List   
 07/19/2018 Lab:  MICROALBUMIN, UR, RAND W/ MICROALB/CREAT RATIO   
  
 07/19/2018 Lab:  URINALYSIS W/ RFLX MICROSCOPIC Patient Instructions Please contact our office if you have any questions about your visit today. Introducing [facility] & HEALTH SERVICES! Nicolas Rod introduces [employer] patient portal. Now you can access parts of your medical record, email your doctor's office, and request medication refills online. 1. In your internet browser, go to [email or web address] 2. Click on the First Time User? Click Here link in the Sign In box. You will see the New Member Sign Up page. 3. Enter your [employer] Access Code exactly as it appears below. You will not need to use this code after youve completed the sign-up process. If you do not sign up before the expiration date, you must request a new code. · [employer] Access Code: [email or web address] Expires: 9/12/2018  2:41 PM 
 
4.  Enter the last four digits of your Social Security Number (xxxx) and Date of Birth (mm/dd/yyyy) as indicated and click Submit. You will be taken to the next sign-up page. 5. Create a [employer]Home ID. This will be your [employer]Home login ID and cannot be changed, so think of one that is secure and easy to remember. 6. Create a [employer]Home password. You can change your password at any time. 7. Enter your Password Reset Question and Answer. This can be used at a later time if you forget your password. 8. Enter your e-mail address. You will receive e-mail notification when new information is available in [address] 9. Click Sign Up. You can now view and download portions of your medical record. 10. Click the Download Summary menu link to download a portable copy of your medical information. If you have questions, please visit the Frequently Asked Questions section of the [employer] website. Remember, [employer] is NOT to be used for urgent needs. For medical emergencies, dial 911. Now available from your iPhone and Android! Please provide this summary of care documentation to your next provider. Your primary care clinician is listed as Whitney Marino. If you have any questions after today's visit, please call [phone].

## 2018-07-20 LAB
CREAT UR-MCNC: 119.77 MG/DL (ref 30–125)
MICROALBUMIN UR-MCNC: 1.1 MG/DL (ref 0–3)
MICROALBUMIN/CREAT UR-RTO: 9 MG/G (ref 0–30)

## 2018-07-23 RX ORDER — CIPROFLOXACIN 250 MG/1
250 TABLET, FILM COATED ORAL EVERY 12 HOURS
Qty: 6 TAB | Refills: 0 | Status: SHIPPED | OUTPATIENT
Start: 2018-07-23 | End: 2018-07-26 | Stop reason: ALTCHOICE

## 2018-07-26 ENCOUNTER — OFFICE VISIT (OUTPATIENT)
Dept: FAMILY MEDICINE CLINIC | Age: 33
End: 2018-07-26

## 2018-07-26 VITALS
WEIGHT: 315 LBS | TEMPERATURE: 96.9 F | DIASTOLIC BLOOD PRESSURE: 71 MMHG | BODY MASS INDEX: 45.1 KG/M2 | SYSTOLIC BLOOD PRESSURE: 135 MMHG | HEIGHT: 70 IN | RESPIRATION RATE: 16 BRPM | HEART RATE: 85 BPM

## 2018-07-26 DIAGNOSIS — I10 ESSENTIAL HYPERTENSION WITH GOAL BLOOD PRESSURE LESS THAN 130/80: ICD-10-CM

## 2018-07-26 DIAGNOSIS — E11.9 TYPE 2 DIABETES MELLITUS WITHOUT COMPLICATION, WITHOUT LONG-TERM CURRENT USE OF INSULIN (HCC): Primary | ICD-10-CM

## 2018-07-26 DIAGNOSIS — R30.0 DYSURIA: ICD-10-CM

## 2018-07-26 RX ORDER — CIPROFLOXACIN 250 MG/1
250 TABLET, FILM COATED ORAL EVERY 12 HOURS
Qty: 6 TAB | Refills: 0 | Status: SHIPPED | OUTPATIENT
Start: 2018-07-26 | End: 2018-07-29

## 2018-07-26 NOTE — PROGRESS NOTES
Chief Complaint   Patient presents with    Diabetes    Follow-up     1. Have you been to the ER, urgent care clinic since your last visit? Hospitalized since your last visit? No    2. Have you seen or consulted any other health care providers outside of the [facility] since your last visit? Include any pap smears or colon screening.  No

## 2018-07-26 NOTE — PROGRESS NOTES
History and Physical    Patient: Aleah De Paz MRN: [id number]  SSN: [id number]    YOB: 1985  Age: 33 y.o. Sex: male      Subjective:      Aleah De Paz is a 33 y.o. male who is here for follow up. The patient would like to review his labs. He also mentions that he never got a call from his pharmacy about picking up his prescription for Cipro. Visit from 7/19/18  The patient mentions that his bladder has been bothering him for about a week. He states that he has been using cranberry juice for about a week and then it resolved and would come back. He also mentions that it is not painful when he urinates. He states that his urine is not discolored this time. He has had UTI's in the past and they have not been like this. In regard to his blood sugar it was in the 200's last night. He mentions that his average blood sugars have been in the 200 range postprandially. He states that he has been using cinnamon as well. He states that he is interested in getting some information on meal preparation. Visit from July 5, 2018  The patient mentions that he does not check his blood sugars regularly. He mentions that he works out every day. He states that he works out about an hour to an hour and a half. He does use the bike and treadmill as well as the elliptical. He mentions that he will alternate days for the weight training and cardio. Visit from 4/13/17   The patient would like to review his labs. Patient does not mention any chest pain, abdominal pain or shortness of breath. Past Medical History:   Diagnosis Date    Diabetes ([address]     Hypercholesterolemia     Hypertension          Prior to Admission medications    Medication Sig Start Date End Date Taking? Authorizing Provider   ciprofloxacin HCl (CIPRO) 250 mg tablet Take 1 Tab by mouth every twelve (12) hours for 3 days.  7/23/18 7/26/18  Ana Luisa Byrne, DO   Blood-Glucose Meter monitoring kit Check blood sugar every other day after largest meal of the day. Goal is 150-170. 7/5/18   Jatin-Edvin B Caty, DO   lancets 32 gauge misc Check blood sugar every other day after largest meal of the day. Goal is 150-170. 7/5/18   Jatin-Edvin B Caty, DO   glucose blood VI test strips (BLOOD GLUCOSE TEST) strip Check blood sugar every other day after largest meal of the day. Goal is 150-170. 7/5/18   Jatin-Devin B Caty, DO   metFORMIN (GLUCOPHAGE) 1,000 mg tablet TAKE ONE TABLET BY MOUTH TWICE DAILY WITH MEALS 6/14/18   Ronal Felipe NP   lisinopril (PRINIVIL, ZESTRIL) 20 mg tablet TAKE ONE TABLET BY MOUTH ONCE DAILY 6/14/18   Ronal Felipe NP   pravastatin (PRAVACHOL) 20 mg tablet TAKE ONE TABLET BY MOUTH NIGHTLY 6/14/18   Ronal Felipe NP   ergocalciferol (ERGOCALCIFEROL) 50,000 unit capsule Take 1 Cap by mouth every seven (7) days. 6/14/18   Ronal Felipe NP        No Known Allergies    Review of Systems:  Pertinent ROS noted in HPI    Objective: There were no vitals taken for this visit. Physical Exam:  GENERAL: alert, cooperative, no distress, appears stated age, morbidly obese  EYE: conjunctivae/corneas clear. PERRL, EOM's intact. Fundi benign  LYMPHATIC: Cervical, supraclavicular, and axillary nodes normal.   THROAT & NECK: normal and no erythema or exudates noted. LUNG: clear to auscultation bilaterally  HEART: regular rate and rhythm, S1, S2 normal, no murmur, click, rub or gallop  ABDOMEN: soft, non-tender.  Bowel sounds normal. No masses,  no organomegaly, abnormal findings:  obese  EXTREMITIES:  extremities normal, atraumatic, no cyanosis or edema    Labs:       Lab Results   Component Value Date/Time    Cholesterol, total 208 (H) 07/19/2018 09:37 AM    HDL Cholesterol 40 07/19/2018 09:37 AM    LDL, calculated 139.6 (H) 07/19/2018 09:37 AM    VLDL, calculated 28.4 07/19/2018 09:37 AM    Triglyceride 142 07/19/2018 09:37 AM    CHOL/HDL Ratio 5.2 (H) 07/19/2018 09:37 AM     Lab Results   Component Value Date/Time    Hemoglobin A1c 6.6 (H) 07/19/2018 09:37 AM     Lab Results   Component Value Date/Time    Sodium 143 07/19/2018 09:37 AM    Potassium 4.8 07/19/2018 09:37 AM    Chloride 107 07/19/2018 09:37 AM    CO2 29 07/19/2018 09:37 AM    Anion gap 7 07/19/2018 09:37 AM    Glucose 118 (H) 07/19/2018 09:37 AM    BUN 16 07/19/2018 09:37 AM    Creatinine 1.05 07/19/2018 09:37 AM    BUN/Creatinine ratio 15 07/19/2018 09:37 AM    GFR est AA >60 07/19/2018 09:37 AM    GFR est non-AA >60 07/19/2018 09:37 AM    Calcium 9.7 07/19/2018 09:37 AM    Bilirubin, total 0.4 07/19/2018 09:37 AM    AST (SGOT) 21 07/19/2018 09:37 AM    Alk. phosphatase 56 07/19/2018 09:37 AM    Protein, total 7.9 07/19/2018 09:37 AM    Albumin 4.0 07/19/2018 09:37 AM    Globulin 3.9 07/19/2018 09:37 AM    A-G Ratio 1.0 07/19/2018 09:37 AM    ALT (SGPT) 40 07/19/2018 09:37 AM       Color YELLOW     Final   Appearance CLEAR     Final   Specific gravity 1.024  1.005 - 1.030   Final   pH (UA) 5.0  5.0 - 8.0   Final   Protein NEGATIVE   NEG mg/dL Final   Glucose NEGATIVE   NEG mg/dL Final   Ketone NEGATIVE   NEG mg/dL Final   Bilirubin NEGATIVE   NEG   Final   Blood NEGATIVE   NEG   Final   Urobilinogen 0.2  0.2 - 1.0 EU/dL Final   Nitrites NEGATIVE   NEG   Final   Leukocyte Esterase SMALL (A) NEG   Final         Assessment:     1.) Dysuria: Patient reordered Cipro due to the fact that he did not  the original script. 2.) Non-Insulin Dependent Diabetes Mellitus Type 2: HgbA1C improved. He was encouraged to continue lifestyle change. He will continue on Metformin 2,000 mg a day. 3.) Hypercholesterolemia: Patient will continue on pravastatin. 4.) Essential Hypertension: Patient will continue on lisinopril. All labs were reviewed and summarized with the patient. Patient will return in 3 months for follow up.        Plan:     Orders Placed This Encounter    METABOLIC PANEL, COMPREHENSIVE    LIPID PANEL    HEMOGLOBIN A1C WITH EAG  ciprofloxacin HCl (CIPRO) 250 mg tablet         Signed By: Dinora Garcia DO     July 26, 2018

## 2018-07-26 NOTE — MR AVS SNAPSHOT
[facility] 
 
 
 [address] [facility] [address] 
[phone] Patient: Usha Lozano MRN: [id number] :1985 Visit Information Date & Time Provider Department Dept. Phone Encounter #  
 2018  8:45 AM Milana Soto Margot [phone] [id number] Follow-up Instructions Return in about 3 months (around 10/26/2018) for Follow Up . Upcoming Health Maintenance Date Due  
 EYE EXAM RETINAL OR DILATED Q1 1996 Pneumococcal 19-64 Medium Risk (1 of 1 - PPSV23) 2005 FOOT EXAM Q1 2018 Influenza Age 5 to Adult 2018 HEMOGLOBIN A1C Q6M 2019 MICROALBUMIN Q1 2019 LIPID PANEL Q1 2019 DTaP/Tdap/Td series (2 - Td) 3/5/2024 Allergies as of 2018  Review Complete On: 2018 By: Milana Soto DO No Known Allergies Current Immunizations  Never Reviewed No immunizations on file. Not reviewed this visit You Were Diagnosed With   
  
 Codes Comments Type 2 diabetes mellitus without complication, without long-term current use of insulin (HCC)    -  Primary ICD-10-CM: E11.9 ICD-9-CM: 250.00 Dysuria     ICD-10-CM: R30.0 ICD-9-CM: 788.1 Essential hypertension with goal blood pressure less than 130/80     ICD-10-CM: I10 
ICD-9-CM: 401.9 Vitals BP Pulse Temp Resp Height(growth percentile) Weight(growth percentile) 135/71 (BP 1 Location: Right arm, BP Patient Position: Sitting) 85 96.9 °F (36.1 °C) (Oral) 16 5' 10\" (1.778 m) (!) 374 lb (169.6 kg) BMI Smoking Status 53.66 kg/m2 Never Smoker BMI and BSA Data Body Mass Index Body Surface Area  
 53.66 kg/m 2 2.89 m 2 Preferred Pharmacy Pharmacy Name Phone [address]  [facility] [place] [phone] Your Updated Medication List  
  
   
 This list is accurate as of 7/26/18  9:28 AM.  Always use your most recent med list.  
  
  
  
  
 Blood-Glucose Meter monitoring kit Check blood sugar every other day after largest meal of the day. Goal is 150-170. ciprofloxacin HCl 250 mg tablet Commonly known as:  CIPRO Take 1 Tab by mouth every twelve (12) hours for 3 days. ergocalciferol 50,000 unit capsule Commonly known as:  ERGOCALCIFEROL Take 1 Cap by mouth every seven (7) days. glucose blood VI test strips strip Commonly known as:  blood glucose test  
Check blood sugar every other day after largest meal of the day. Goal is 150-170. lancets 32 gauge Misc Check blood sugar every other day after largest meal of the day. Goal is 150-170. lisinopril 20 mg tablet Commonly known as:  PRINIVIL, ZESTRIL  
TAKE ONE TABLET BY MOUTH ONCE DAILY  
  
 metFORMIN 1,000 mg tablet Commonly known as:  GLUCOPHAGE  
TAKE ONE TABLET BY MOUTH TWICE DAILY WITH MEALS  
  
 pravastatin 20 mg tablet Commonly known as:  PRAVACHOL  
TAKE ONE TABLET BY MOUTH NIGHTLY Prescriptions Sent to Pharmacy Refills  
 ciprofloxacin HCl (CIPRO) 250 mg tablet 0 Sig: Take 1 Tab by mouth every twelve (12) hours for 3 days. Class: Normal  
 Pharmacy: [facility] DR LENY ELIAS [address] [place] [address] Ph #: [phone] Route: Oral  
  
Follow-up Instructions Return in about 3 months (around 10/26/2018) for Follow Up . To-Do List   
 07/26/2018 Lab:  HEMOGLOBIN A1C WITH EAG Around 10/24/2018 Lab:  LIPID PANEL Around 10/24/2018 Lab:  METABOLIC PANEL, COMPREHENSIVE Patient Instructions Please contact our office if you have any questions about your visit today. 1.) OK to use Fish Oil in combination with the cholesterol medication (Pravastatin). Please  antibiotic for urinary tract infection. 2.) Continue on Metformin. 3.) Increase exercise to 5 times a week. 4.) Hope is to get you off cholesterol and diabetes medications if your numbers come down to normal or less than normal.  
 
5.) Get labs before next visit. 6.) Return in 3 months for follow up.  
 
7.) LLC Information through [address] (You can Google her) [address] for Complementary and Alternative Medicine [email or web address]/ 
 
 
 
  
Painful Urination (Dysuria): Care Instructions Your Care Instructions Burning pain with urination (dysuria) is a common symptom of a urinary tract infection or other urinary problems. The bladder may become inflamed. This can cause pain when the bladder fills and empties. You may also feel pain if the tube that carries urine from the bladder to the outside of the body (urethra) gets irritated or infected. Sexually transmitted infections (STIs) also may cause pain when you urinate. Sometimes the pain can be caused by things other than an infection. The urethra can be irritated by soaps, perfumes, or foreign objects in the urethra. Kidney stones can cause pain when they pass through the urethra. The cause may be hard to find. You may need tests. Treatment for painful urination depends on the cause. Follow-up care is a key part of your treatment and safety. Be sure to make and go to all appointments, and call your doctor if you are having problems. It's also a good idea to know your test results and keep a list of the medicines you take. How can you care for yourself at home? · Drink extra water for the next day or two. This will help make the urine less concentrated. (If you have kidney, heart, or liver disease and have to limit fluids, talk with your doctor before you increase the amount of fluids you drink.) · Avoid drinks that are carbonated or have caffeine. They can irritate the bladder. · Urinate often. Try to empty your bladder each time. For women: · Urinate right after you have sex. · After going to the bathroom, wipe from front to back. · Avoid douches, bubble baths, and feminine hygiene sprays. And avoid other feminine hygiene products that have deodorants. When should you call for help? Call your doctor now or seek immediate medical care if: 
  · You have new symptoms, such as fever, nausea, or vomiting.  
  · You have new or worse symptoms of a urinary problem. For example: ¨ You have blood or pus in your urine. ¨ You have chills or body aches. ¨ It hurts worse to urinate. ¨ You have groin or belly pain. ¨ You have pain in your back just below your rib cage (the flank area).  
 Watch closely for changes in your health, and be sure to contact your doctor if you have any problems. Where can you learn more? Go to http://christine-titus.info/. Enter P972 in the search box to learn more about \"Painful Urination (Dysuria): Care Instructions. \" Current as of: May 12, 2017 Content Version: 11.7 © 4248-8614 [employer]. Care instructions adapted under license by [employer] (which disclaims liability or warranty for this information). If you have questions about a medical condition or this instruction, always ask your healthcare professional. [address] any warranty or liability for your use of this information. Introducing hospitals & HEALTH SERVICES! [facility] introduces [employer] patient portal. Now you can access parts of your medical record, email your doctor's office, and request medication refills online. 1. In your internet browser, go to [email or web address] 2. Click on the First Time User? Click Here link in the Sign In box. You will see the New Member Sign Up page. 3. Enter your [employer] Access Code exactly as it appears below. You will not need to use this code after youve completed the sign-up process. If you do not sign up before the expiration date, you must request a new code. · [employer] Access Code: [email or web address] Expires: 9/12/2018  2:41 PM 
 
4. Enter the last four digits of your Social Security Number (xxxx) and Date of Birth (mm/dd/yyyy) as indicated and click Submit. You will be taken to the next sign-up page. 5. Create a [employer] ID. This will be your [employer] login ID and cannot be changed, so think of one that is secure and easy to remember. 6. Create a [employer] password. You can change your password at any time. 7. Enter your Password Reset Question and Answer. This can be used at a later time if you forget your password. 8. Enter your e-mail address. You will receive e-mail notification when new information is available in [address] 9. Click Sign Up. You can now view and download portions of your medical record. 10. Click the Download Summary menu link to download a portable copy of your medical information. If you have questions, please visit the Frequently Asked Questions section of the [employer] website. Remember, [employer] is NOT to be used for urgent needs. For medical emergencies, dial 911. Now available from your iPhone and Android! Please provide this summary of care documentation to your next provider. Your primary care clinician is listed as Amy Belcher. If you have any questions after today's visit, please call [phone].

## 2018-07-26 NOTE — PATIENT INSTRUCTIONS
Please contact our office if you have any questions about your visit today. 1.) OK to use Fish Oil in combination with the cholesterol medication (Pravastatin). Please  antibiotic for urinary tract infection. 2.) Continue on Metformin. 3.) Increase exercise to 5 times a week. 4.) Hope is to get you off cholesterol and diabetes medications if your numbers come down to normal or less than normal.     5.) Get labs before next visit. 6.) Return in 3 months for follow up.     7.) LLC Information through [address] (You can Google her)    8.) [employer] for Complementary and Alternative Medicine [email or web address]/           Painful Urination (Dysuria): Care Instructions  Your Care Instructions  Burning pain with urination (dysuria) is a common symptom of a urinary tract infection or other urinary problems. The bladder may become inflamed. This can cause pain when the bladder fills and empties. You may also feel pain if the tube that carries urine from the bladder to the outside of the body (urethra) gets irritated or infected. Sexually transmitted infections (STIs) also may cause pain when you urinate. Sometimes the pain can be caused by things other than an infection. The urethra can be irritated by soaps, perfumes, or foreign objects in the urethra. Kidney stones can cause pain when they pass through the urethra. The cause may be hard to find. You may need tests. Treatment for painful urination depends on the cause. Follow-up care is a key part of your treatment and safety. Be sure to make and go to all appointments, and call your doctor if you are having problems. It's also a good idea to know your test results and keep a list of the medicines you take. How can you care for yourself at home? · Drink extra water for the next day or two. This will help make the urine less concentrated.  (If you have kidney, heart, or liver disease and have to limit fluids, talk with your doctor before you increase the amount of fluids you drink.)  · Avoid drinks that are carbonated or have caffeine. They can irritate the bladder. · Urinate often. Try to empty your bladder each time. For women:  · Urinate right after you have sex. · After going to the bathroom, wipe from front to back. · Avoid douches, bubble baths, and feminine hygiene sprays. And avoid other feminine hygiene products that have deodorants. When should you call for help? Call your doctor now or seek immediate medical care if:    · You have new symptoms, such as fever, nausea, or vomiting.     · You have new or worse symptoms of a urinary problem. For example:  ¨ You have blood or pus in your urine. ¨ You have chills or body aches. ¨ It hurts worse to urinate. ¨ You have groin or belly pain. ¨ You have pain in your back just below your rib cage (the flank area).    Watch closely for changes in your health, and be sure to contact your doctor if you have any problems. Where can you learn more? Go to http://christine-titus.info/. Enter I362 in the search box to learn more about \"Painful Urination (Dysuria): Care Instructions. \"  Current as of: May 12, 2017  Content Version: 11.7  © 0242-0819 [employer]. Care instructions adapted under license by [employer] (which disclaims liability or warranty for this information). If you have questions about a medical condition or this instruction, always ask your healthcare professional. [place] [address] any warranty or liability for your use of this information.

## 2018-10-18 ENCOUNTER — OFFICE VISIT (OUTPATIENT)
Dept: FAMILY MEDICINE CLINIC | Age: 33
End: 2018-10-18

## 2018-10-18 ENCOUNTER — HOSPITAL ENCOUNTER (OUTPATIENT)
Dept: LAB | Age: 33
Discharge: HOME | End: 2018-10-18
Payer: COMMERCIAL

## 2018-10-18 VITALS
WEIGHT: 315 LBS | RESPIRATION RATE: 20 BRPM | TEMPERATURE: 97.6 F | SYSTOLIC BLOOD PRESSURE: 140 MMHG | HEIGHT: 70 IN | HEART RATE: 76 BPM | DIASTOLIC BLOOD PRESSURE: 93 MMHG | BODY MASS INDEX: 45.1 KG/M2

## 2018-10-18 DIAGNOSIS — E11.9 TYPE 2 DIABETES MELLITUS WITHOUT COMPLICATION, WITHOUT LONG-TERM CURRENT USE OF INSULIN (HCC): ICD-10-CM

## 2018-10-18 DIAGNOSIS — E78.00 HYPERCHOLESTEROLEMIA: ICD-10-CM

## 2018-10-18 DIAGNOSIS — I10 ESSENTIAL HYPERTENSION WITH GOAL BLOOD PRESSURE LESS THAN 130/80: ICD-10-CM

## 2018-10-18 DIAGNOSIS — R30.0 DYSURIA: ICD-10-CM

## 2018-10-18 PROCEDURE — 36415 COLL VENOUS BLD VENIPUNCTURE: CPT | Performed by: INTERNAL MEDICINE

## 2018-10-18 PROCEDURE — 80053 COMPREHEN METABOLIC PANEL: CPT | Performed by: INTERNAL MEDICINE

## 2018-10-18 PROCEDURE — 80061 LIPID PANEL: CPT | Performed by: INTERNAL MEDICINE

## 2018-10-18 PROCEDURE — 83036 HEMOGLOBIN GLYCOSYLATED A1C: CPT | Performed by: INTERNAL MEDICINE

## 2018-10-18 RX ORDER — METFORMIN HYDROCHLORIDE 1000 MG/1
TABLET ORAL
Qty: 60 TAB | Refills: 2 | Status: SHIPPED | OUTPATIENT
Start: 2018-10-18 | End: 2019-03-14 | Stop reason: SDUPTHER

## 2018-10-18 RX ORDER — LISINOPRIL 20 MG/1
TABLET ORAL
Qty: 30 TAB | Refills: 4 | Status: SHIPPED | OUTPATIENT
Start: 2018-10-18 | End: 2019-05-28 | Stop reason: SDUPTHER

## 2018-10-18 RX ORDER — PRAVASTATIN SODIUM 20 MG/1
TABLET ORAL
Qty: 90 TAB | Refills: 0 | Status: SHIPPED | OUTPATIENT
Start: 2018-10-18 | End: 2019-03-14 | Stop reason: SDUPTHER

## 2018-10-18 NOTE — PROGRESS NOTES
HPI 
Fina Mendes is a 33 y.o. male Chief Complaint Patient presents with  Diabetes  Cholesterol Problem  
  high chol  Hypertension Reports he is going to start a full blown herb regimen that will cure his diabetes, hypertension, and cholesterol. Reports he is going to wean himself of the medication. Denies chest pain and or shortness of breath. Denies swelling his lower extremities or ankles. Denies hypo or hyperglycemic episodes. Denies nausea, vomiting, or abdominal pain. Reports he still needs medication refills as he is not stopping as of yet. Denies falls or injury. Past Medical History Past Medical History:  
Diagnosis Date  Diabetes ([facility] [address]  Hypercholesterolemia  Hypertension Surgical History No past surgical history on file. Medications Current Outpatient Medications Medication Sig Dispense Refill  lisinopril (PRINIVIL, ZESTRIL) 20 mg tablet TAKE 1 TABLET BY MOUTH ONCE DAILY 30 Tab 4  
 metFORMIN (GLUCOPHAGE) 1,000 mg tablet TAKE 1 TABLET BY MOUTH TWICE DAILY WITH MEALS 60 Tab 2  pravastatin (PRAVACHOL) 20 mg tablet TAKE 1 TABLET BY MOUTH NIGHTLY 90 Tab 0  Blood-Glucose Meter monitoring kit Check blood sugar every other day after largest meal of the day. Goal is 150-170. 1 Kit 0  
 lancets 32 gauge misc Check blood sugar every other day after largest meal of the day. Goal is 150-170. 100 Lancet 3  
 glucose blood VI test strips (BLOOD GLUCOSE TEST) strip Check blood sugar every other day after largest meal of the day. Goal is 150-170. 100 Strip 3  ergocalciferol (ERGOCALCIFEROL) 50,000 unit capsule Take 1 Cap by mouth every seven (7) days. 12 Cap 1 Allergies No Known Allergies Family History Family History Problem Relation Age of Onset  Hypertension Mother  Cancer Father   
     colon  Hypertension Father Social History Social History Socioeconomic History  Marital status: SINGLE  
 Spouse name: Not on file  Number of children: Not on file  Years of education: Not on file  Highest education level: Not on file Social Needs  Financial resource strain: Not on file  Food insecurity - worry: Not on file  Food insecurity - inability: Not on file  Transportation needs - medical: Not on file  Transportation needs - non-medical: Not on file Occupational History  Not on file Tobacco Use  Smoking status: Never Smoker  Smokeless tobacco: Former User Substance and Sexual Activity  Alcohol use: Yes Alcohol/week: 1.2 oz Types: 2 Cans of beer per week  Drug use: No  
 Sexual activity: Yes Other Topics Concern  Not on file Social History Narrative  Not on file Problem List 
Patient Active Problem List  
Diagnosis Code  Type 2 diabetes mellitus without complication, without long-term current use of insulin ([facility]) E11.9  Preventive measure Z29.9  Essential hypertension with goal blood pressure less than 130/80 I10  Vitamin D deficiency E55.9  Obesity, morbid ([facility] [address] E66.01 Review of Systems Review of Systems Constitutional: Negative for chills and fever. Eyes: Negative for blurred vision. Respiratory: Negative for shortness of breath. Cardiovascular: Negative for chest pain, palpitations and leg swelling. Gastrointestinal: Negative for abdominal pain, nausea and vomiting. Musculoskeletal: Negative for falls. Neurological: Negative for dizziness. Vital Signs Vitals:  
 10/18/18 1316 BP: (!) 140/93 Pulse: 76 Resp: 20 Temp: 97.6 °F (36.4 °C) TempSrc: Oral  
Weight: (!) 365 lb (165.6 kg) Height: 5' 10\" (1.778 m) PainSc:   0 - No pain Physical Exam 
Physical Exam  
Constitutional: He is oriented to person, place, and time. HENT:  
Mouth/Throat: Oropharynx is clear and moist.  
Cardiovascular: Normal rate, regular rhythm and normal heart sounds. Pulmonary/Chest: Effort normal and breath sounds normal.  
Abdominal: Soft. Bowel sounds are normal. He exhibits no distension. There is no tenderness. Neurological: He is alert and oriented to person, place, and time. Psychiatric: He has a normal mood and affect. His behavior is normal.  
Vitals reviewed. Diagnostics Orders Placed This Encounter  lisinopril (PRINIVIL, ZESTRIL) 20 mg tablet Sig: TAKE 1 TABLET BY MOUTH ONCE DAILY Dispense:  30 Tab Refill:  4 Please consider 90 day supplies to promote better adherence  metFORMIN (GLUCOPHAGE) 1,000 mg tablet Sig: TAKE 1 TABLET BY MOUTH TWICE DAILY WITH MEALS Dispense:  60 Tab Refill:  2 Please consider 90 day supplies to promote better adherence  pravastatin (PRAVACHOL) 20 mg tablet Sig: TAKE 1 TABLET BY MOUTH NIGHTLY Dispense:  90 Tab Refill:  0 Please consider 90 day supplies to promote better adherence Results Results for orders placed or performed during the hospital encounter of 07/19/18 HEMOGLOBIN A1C WITH EAG Result Value Ref Range Hemoglobin A1c 6.6 (H) 4.2 - 5.6 % Est. average glucose 143 mg/dL METABOLIC PANEL, COMPREHENSIVE Result Value Ref Range Sodium 143 136 - 145 mmol/L Potassium 4.8 3.5 - 5.5 mmol/L Chloride 107 100 - 108 mmol/L  
 CO2 29 21 - 32 mmol/L Anion gap 7 3.0 - 18 mmol/L Glucose 118 (H) 74 - 99 mg/dL BUN 16 7.0 - 18 MG/DL Creatinine 1.05 0.6 - 1.3 MG/DL  
 BUN/Creatinine ratio 15 12 - 20 GFR est AA >60 >60 ml/min/1.73m2 GFR est non-AA >60 >60 ml/min/1.73m2 Calcium 9.7 8.5 - 10.1 MG/DL Bilirubin, total 0.4 0.2 - 1.0 MG/DL  
 ALT (SGPT) 40 16 - 61 U/L  
 AST (SGOT) 21 15 - 37 U/L Alk. phosphatase 56 45 - 117 U/L Protein, total 7.9 6.4 - 8.2 g/dL Albumin 4.0 3.4 - 5.0 g/dL Globulin 3.9 2.0 - 4.0 g/dL A-G Ratio 1.0 0.8 - 1.7 LIPID PANEL Result Value Ref Range  LIPID PROFILE        
 Cholesterol, total 208 (H) <200 MG/DL Triglyceride 142 <150 MG/DL  
 HDL Cholesterol 40 40 - 60 MG/DL  
 LDL, calculated 139.6 (H) 0 - 100 MG/DL VLDL, calculated 28.4 MG/DL  
 CHOL/HDL Ratio 5.2 (H) 0 - 5.0    
URINALYSIS W/ RFLX MICROSCOPIC Result Value Ref Range Color YELLOW Appearance CLEAR Specific gravity 1.024 1.005 - 1.030    
 pH (UA) 5.0 5.0 - 8.0 Protein NEGATIVE  NEG mg/dL Glucose NEGATIVE  NEG mg/dL Ketone NEGATIVE  NEG mg/dL Bilirubin NEGATIVE  NEG Blood NEGATIVE  NEG Urobilinogen 0.2 0.2 - 1.0 EU/dL Nitrites NEGATIVE  NEG Leukocyte Esterase SMALL (A) NEG    
MICROALBUMIN, UR, RAND W/ MICROALB/CREAT RATIO Result Value Ref Range Microalbumin,urine random 1.10 0 - 3.0 MG/DL Creatinine, urine 119.77 30 - 125 mg/dL Microalbumin/Creat ratio (mg/g creat) 9 0 - 30 mg/g URINE MICROSCOPIC ONLY Result Value Ref Range WBC 11 to 20 0 - 4 /hpf  
 RBC NEGATIVE  0 - 5 /hpf Epithelial cells 1+ 0 - 5 /lpf Bacteria NEGATIVE  NEG /hpf  
 Uric acid crystals 2+ (A) NEG Assessment and Plan Diagnoses and all orders for this visit: 
 
BMI 50.0-59.9, adult ([facility] [address] Type 2 diabetes mellitus without complication, without long-term current use of insulin (HCC) 
-     lisinopril (PRINIVIL, ZESTRIL) 20 mg tablet; TAKE 1 TABLET BY MOUTH ONCE DAILY 
-     metFORMIN (GLUCOPHAGE) 1,000 mg tablet; TAKE 1 TABLET BY MOUTH TWICE DAILY WITH MEALS 
-     pravastatin (PRAVACHOL) 20 mg tablet; TAKE 1 TABLET BY MOUTH NIGHTLY Essential hypertension with goal blood pressure less than 130/80 
-     lisinopril (PRINIVIL, ZESTRIL) 20 mg tablet; TAKE 1 TABLET BY MOUTH ONCE DAILY 
-     pravastatin (PRAVACHOL) 20 mg tablet; TAKE 1 TABLET BY MOUTH NIGHTLY Hypercholesterolemia -     pravastatin (PRAVACHOL) 20 mg tablet; TAKE 1 TABLET BY MOUTH NIGHTLY Cautioned patient regarding herbs and prescription medications and advised against.  
 After care summary printed and reviewed with patient. Plan reviewed with patient. Questions answered. Patient verbalized understanding of plan and is in agreement with plan. Patient to follow up in one month or earlier if symptoms worsen. JOE Norman Discussed the patient's BMI with him. The BMI follow up plan is as follows:  
 
dietary management education, guidance, and counseling 
encourage exercise 
monitor weight 
prescribed dietary intake An After Visit Summary was printed and given to the patient.  
 
WALDO GonzalezC

## 2018-10-18 NOTE — PROGRESS NOTES
Chief Complaint Patient presents with  Diabetes  Cholesterol Problem  
  high chol  Hypertension Health Maintenance Due Topic Date Due  
 EYE EXAM RETINAL OR DILATED Q1  11/19/1996  Pneumococcal 19-64 Medium Risk (1 of 1 - PPSV23) 11/19/2005  
 FOOT EXAM Q1  04/13/2018  Influenza Age 5 to Adult  08/01/2018 Health Maintenance reviewed 1. Have you been to the ER, urgent care clinic since your last visit? Hospitalized since your last visit? No 
 
2. Have you seen or consulted any other health care providers outside of the [address] [place] [address] since your last visit? Include any pap smears or colon screening. No 
 
 
Abuse and learning screening updated

## 2018-10-18 NOTE — PATIENT INSTRUCTIONS
Please contact our office if you have any questions about your visit today. Body Mass Index: Care Instructions Your Care Instructions Body mass index (BMI) can help you see if your weight is raising your risk for health problems. It uses a formula to compare how much you weigh with how tall you are. · A BMI lower than 18.5 is considered underweight. · A BMI between 18.5 and 24.9 is considered healthy. · A BMI between 25 and 29.9 is considered overweight. A BMI of 30 or higher is considered obese. If your BMI is in the normal range, it means that you have a lower risk for weight-related health problems. If your BMI is in the overweight or obese range, you may be at increased risk for weight-related health problems, such as high blood pressure, heart disease, stroke, arthritis or joint pain, and diabetes. If your BMI is in the underweight range, you may be at increased risk for health problems such as fatigue, lower protection (immunity) against illness, muscle loss, bone loss, hair loss, and hormone problems. BMI is just one measure of your risk for weight-related health problems. You may be at higher risk for health problems if you are not active, you eat an unhealthy diet, or you drink too much alcohol or use tobacco products. Follow-up care is a key part of your treatment and safety. Be sure to make and go to all appointments, and call your doctor if you are having problems. It's also a good idea to know your test results and keep a list of the medicines you take. How can you care for yourself at home? · Practice healthy eating habits. This includes eating plenty of fruits, vegetables, whole grains, lean protein, and low-fat dairy. · If your doctor recommends it, get more exercise. Walking is a good choice. Bit by bit, increase the amount you walk every day. Try for at least 30 minutes on most days of the week. · Do not smoke. Smoking can increase your risk for health problems.  If you need help quitting, talk to your doctor about stop-smoking programs and medicines. These can increase your chances of quitting for good. · Limit alcohol to 2 drinks a day for men and 1 drink a day for women. Too much alcohol can cause health problems. If you have a BMI higher than 25 · Your doctor may do other tests to check your risk for weight-related health problems. This may include measuring the distance around your waist. A waist measurement of more than 40 inches in men or 35 inches in women can increase the risk of weight-related health problems. · Talk with your doctor about steps you can take to stay healthy or improve your health. You may need to make lifestyle changes to lose weight and stay healthy, such as changing your diet and getting regular exercise. If you have a BMI lower than 18.5 · Your doctor may do other tests to check your risk for health problems. · Talk with your doctor about steps you can take to stay healthy or improve your health. You may need to make lifestyle changes to gain or maintain weight and stay healthy, such as getting more healthy foods in your diet and doing exercises to build muscle. Where can you learn more? Go to http://christine-titus.info/. Enter S176 in the search box to learn more about \"Body Mass Index: Care Instructions. \" Current as of: October 13, 2016 Content Version: 11.4 © 4920-6931 Healthwise, Incorporated. Care instructions adapted under license by [employer] (which disclaims liability or warranty for this information). If you have questions about a medical condition or this instruction, always ask your healthcare professional. [place] [address] any warranty or liability for your use of this information. Body Mass Index: Care Instructions Your Care Instructions Body mass index (BMI) can help you see if your weight is raising your risk for health problems. It uses a formula to compare how much you weigh with how tall you are. · A BMI lower than 18.5 is considered underweight. · A BMI between 18.5 and 24.9 is considered healthy. · A BMI between 25 and 29.9 is considered overweight. A BMI of 30 or higher is considered obese. If your BMI is in the normal range, it means that you have a lower risk for weight-related health problems. If your BMI is in the overweight or obese range, you may be at increased risk for weight-related health problems, such as high blood pressure, heart disease, stroke, arthritis or joint pain, and diabetes. If your BMI is in the underweight range, you may be at increased risk for health problems such as fatigue, lower protection (immunity) against illness, muscle loss, bone loss, hair loss, and hormone problems. BMI is just one measure of your risk for weight-related health problems. You may be at higher risk for health problems if you are not active, you eat an unhealthy diet, or you drink too much alcohol or use tobacco products. Follow-up care is a key part of your treatment and safety. Be sure to make and go to all appointments, and call your doctor if you are having problems. It's also a good idea to know your test results and keep a list of the medicines you take. How can you care for yourself at home? · Practice healthy eating habits. This includes eating plenty of fruits, vegetables, whole grains, lean protein, and low-fat dairy. · If your doctor recommends it, get more exercise. Walking is a good choice. Bit by bit, increase the amount you walk every day. Try for at least 30 minutes on most days of the week. · Do not smoke. Smoking can increase your risk for health problems. If you need help quitting, talk to your doctor about stop-smoking programs and medicines. These can increase your chances of quitting for good. · Limit alcohol to 2 drinks a day for men and 1 drink a day for women.  Too much alcohol can cause health problems. If you have a BMI higher than 25 · Your doctor may do other tests to check your risk for weight-related health problems. This may include measuring the distance around your waist. A waist measurement of more than 40 inches in men or 35 inches in women can increase the risk of weight-related health problems. · Talk with your doctor about steps you can take to stay healthy or improve your health. You may need to make lifestyle changes to lose weight and stay healthy, such as changing your diet and getting regular exercise. If you have a BMI lower than 18.5 · Your doctor may do other tests to check your risk for health problems. · Talk with your doctor about steps you can take to stay healthy or improve your health. You may need to make lifestyle changes to gain or maintain weight and stay healthy, such as getting more healthy foods in your diet and doing exercises to build muscle. Where can you learn more? Go to http://christine-titus.info/. Enter S176 in the search box to learn more about \"Body Mass Index: Care Instructions. \" Current as of: October 13, 2016 Content Version: 11.4 © 6519-2584 Healthwise, Incorporated. Care instructions adapted under license by [employer] (which disclaims liability or warranty for this information). If you have questions about a medical condition or this instruction, always ask your healthcare professional. [address] any warranty or liability for your use of this information.

## 2018-10-19 LAB
ALBUMIN SERPL-MCNC: 4.2 G/DL (ref 3.4–5)
ALBUMIN/GLOB SERPL: 1.1 {RATIO} (ref 0.8–1.7)
ALP SERPL-CCNC: 61 U/L (ref 45–117)
ALT SERPL-CCNC: 44 U/L (ref 16–61)
ANION GAP SERPL CALC-SCNC: 5 MMOL/L (ref 3–18)
AST SERPL-CCNC: 26 U/L (ref 15–37)
BILIRUB SERPL-MCNC: 0.4 MG/DL (ref 0.2–1)
BUN SERPL-MCNC: 10 MG/DL (ref 7–18)
BUN/CREAT SERPL: 9 (ref 12–20)
CALCIUM SERPL-MCNC: 9.6 MG/DL (ref 8.5–10.1)
CHLORIDE SERPL-SCNC: 102 MMOL/L (ref 100–108)
CHOLEST SERPL-MCNC: 201 MG/DL
CO2 SERPL-SCNC: 31 MMOL/L (ref 21–32)
CREAT SERPL-MCNC: 1.07 MG/DL (ref 0.6–1.3)
EST. AVERAGE GLUCOSE BLD GHB EST-MCNC: ABNORMAL MG/DL
GLOBULIN SER CALC-MCNC: 3.7 G/DL (ref 2–4)
GLUCOSE SERPL-MCNC: 102 MG/DL (ref 74–99)
HBA1C MFR BLD: <3.5 % (ref 4.2–5.6)
HDLC SERPL-MCNC: 39 MG/DL (ref 40–60)
HDLC SERPL: 5.2 {RATIO} (ref 0–5)
LDLC SERPL CALC-MCNC: 129.8 MG/DL (ref 0–100)
LIPID PROFILE,FLP: ABNORMAL
POTASSIUM SERPL-SCNC: 5 MMOL/L (ref 3.5–5.5)
PROT SERPL-MCNC: 7.9 G/DL (ref 6.4–8.2)
SODIUM SERPL-SCNC: 138 MMOL/L (ref 136–145)
TRIGL SERPL-MCNC: 161 MG/DL (ref ?–150)
VLDLC SERPL CALC-MCNC: 32.2 MG/DL

## 2018-11-15 ENCOUNTER — OFFICE VISIT (OUTPATIENT)
Dept: FAMILY MEDICINE CLINIC | Age: 33
End: 2018-11-15

## 2018-11-15 VITALS
DIASTOLIC BLOOD PRESSURE: 90 MMHG | SYSTOLIC BLOOD PRESSURE: 156 MMHG | HEIGHT: 70 IN | BODY MASS INDEX: 45.1 KG/M2 | TEMPERATURE: 98.5 F | HEART RATE: 84 BPM | RESPIRATION RATE: 16 BRPM | WEIGHT: 315 LBS

## 2018-11-15 DIAGNOSIS — E11.9 TYPE 2 DIABETES MELLITUS WITHOUT COMPLICATION, WITHOUT LONG-TERM CURRENT USE OF INSULIN (HCC): ICD-10-CM

## 2018-11-15 DIAGNOSIS — E78.5 HYPERLIPIDEMIA, UNSPECIFIED HYPERLIPIDEMIA TYPE: ICD-10-CM

## 2018-11-15 DIAGNOSIS — Z71.2 ENCOUNTER TO DISCUSS TEST RESULTS: ICD-10-CM

## 2018-11-15 DIAGNOSIS — I10 ESSENTIAL HYPERTENSION WITH GOAL BLOOD PRESSURE LESS THAN 130/80: Primary | ICD-10-CM

## 2018-11-15 LAB — HBA1C MFR BLD HPLC: 6.4 %

## 2018-11-15 NOTE — PROGRESS NOTES
Chief Complaint   Patient presents with    Results     discuss lab results         Health Maintenance Due   Topic Date Due    EYE EXAM RETINAL OR DILATED Q1  11/19/1996    Pneumococcal 19-64 Medium Risk (1 of 1 - PPSV23) 11/19/2005    FOOT EXAM Q1  04/13/2018       Health Maintenance reviewed       1. Have you been to the ER, urgent care clinic since your last visit? Hospitalized since your last visit? No    2. Have you seen or consulted any other health care providers outside of the [facility] since your last visit? Include any pap smears or colon screening.  No

## 2018-11-15 NOTE — PROGRESS NOTES
LIZETTE Ghotra is a 33 y.o. male  Chief Complaint   Patient presents with    Results     discuss lab results      Hypertension - Reports he has not had his blood pressure medication in 4 days as he ran out and has not picked this up from the pharmacy. Denies chest pain, shortness of breath, or swelling in his lower extremities. Denies dizziness or blurred vision. He has started his herb regimen and would like his lab results. Reports he does check his blood glucose in the morning and this has been running around 220. Past Medical History  Past Medical History:   Diagnosis Date    Diabetes ([address]     Hypercholesterolemia     Hypertension        Surgical History  No past surgical history on file. Medications  Current Outpatient Medications   Medication Sig Dispense Refill    lisinopril (PRINIVIL, ZESTRIL) 20 mg tablet TAKE 1 TABLET BY MOUTH ONCE DAILY 30 Tab 4    metFORMIN (GLUCOPHAGE) 1,000 mg tablet TAKE 1 TABLET BY MOUTH TWICE DAILY WITH MEALS 60 Tab 2    pravastatin (PRAVACHOL) 20 mg tablet TAKE 1 TABLET BY MOUTH NIGHTLY 90 Tab 0    Blood-Glucose Meter monitoring kit Check blood sugar every other day after largest meal of the day. Goal is 150-170. 1 Kit 0    lancets 32 gauge misc Check blood sugar every other day after largest meal of the day. Goal is 150-170. 100 Lancet 3    glucose blood VI test strips (BLOOD GLUCOSE TEST) strip Check blood sugar every other day after largest meal of the day. Goal is 150-170. 100 Strip 3    ergocalciferol (ERGOCALCIFEROL) 50,000 unit capsule Take 1 Cap by mouth every seven (7) days.  12 Cap 1       Allergies  No Known Allergies    Family History  Family History   Problem Relation Age of Onset    Hypertension Mother     Cancer Father         colon    Hypertension Father        Social History  Social History     Socioeconomic History    Marital status: SINGLE     Spouse name: Not on file    Number of children: Not on file    Years of education: Not on file    Highest education level: Not on file   Social Needs    Financial resource strain: Not on file    Food insecurity - worry: Not on file    Food insecurity - inability: Not on file    Transportation needs - medical: Not on file   [employer] needs - non-medical: Not on file   Occupational History    Not on file   Tobacco Use    Smoking status: Never Smoker    Smokeless tobacco: Former User   Substance and Sexual Activity    Alcohol use: Yes     Alcohol/week: 1.2 oz     Types: 2 Cans of beer per week    Drug use: No    Sexual activity: Yes   Other Topics Concern    Not on file   Social History Narrative    Not on file       Problem List  Patient Active Problem List   Diagnosis Code    Type 2 diabetes mellitus without complication, without long-term current use of insulin ([facility] E11.9    Preventive measure Z29.9    Essential hypertension with goal blood pressure less than 130/80 I10    Vitamin D deficiency E55.9    Obesity, morbid ([facility] E66.01       Review of Systems  Review of Systems   Constitutional: Negative for chills and fever. Respiratory: Negative for shortness of breath. Cardiovascular: Negative for chest pain and leg swelling. Gastrointestinal: Negative for abdominal pain, nausea and vomiting. Musculoskeletal: Negative for falls and myalgias. Neurological: Negative for loss of consciousness. Vital Signs  Vitals:    11/15/18 1017 11/15/18 1022   BP: 153/82 156/90   Pulse: 84    Resp: 16    Temp: 98.5 °F (36.9 °C)    TempSrc: Oral    Weight: (!) 373 lb 6.4 oz (169.4 kg)    Height: 5' 10\" (1.778 m)    PainSc:   0 - No pain        Physical Exam  Physical Exam   Constitutional: He is oriented to person, place, and time. HENT:   Mouth/Throat: Oropharynx is clear and moist.   Eyes: Pupils are equal, round, and reactive to light. Cardiovascular: Normal rate, regular rhythm, normal heart sounds and intact distal pulses.    Pulmonary/Chest: Effort normal and breath sounds normal. No respiratory distress. Abdominal: Soft. Bowel sounds are normal. He exhibits no distension. There is no tenderness. Neurological: He is alert and oriented to person, place, and time. No cranial nerve deficit. Coordination normal.   Skin: Skin is warm and dry. Psychiatric: He has a normal mood and affect. Vitals reviewed. Diagnostics  Orders Placed This Encounter    AMB POC HEMOGLOBIN A1C       Results  Results for orders placed or performed in visit on 11/15/18   AMB POC HEMOGLOBIN A1C   Result Value Ref Range    Hemoglobin A1c (POC) 6.4 %       Assessment and Plan  Diagnoses and all orders for this visit:    1. Essential hypertension with goal blood pressure less than 130/80    2. Encounter to discuss test results    3. Hyperlipidemia, unspecified hyperlipidemia type  -     AMB POC HEMOGLOBIN A1C    4. BMI 50.0-59.9, adult (HCC)  -     AMB POC HEMOGLOBIN A1C    5. Type 2 diabetes mellitus without complication, without long-term current use of insulin (HCC)  -     AMB POC HEMOGLOBIN A1C      Discussed diet and exercise in relation to his hyperlipidemia, diabetes, and hypertension. Discussed BMI and obesity in relation to his DM, HTN, and hyperlipidemia putting him at risk for a cardiovascular event. He verbalized understanding. Medications discussed with patient along with disease process. Patient states he does not want to start or adjust on anything different at this time as his herb regimen will help cure all his conditions. After care summary printed and reviewed with patient. Plan reviewed with patient. Questions answered. Patient verbalized understanding of plan and is in agreement with plan. Patient to follow up in three months or earlier if symptoms worsen.      JOE Grande

## 2018-12-21 DIAGNOSIS — E55.9 VITAMIN D DEFICIENCY: ICD-10-CM

## 2018-12-26 RX ORDER — ERGOCALCIFEROL 1.25 MG/1
CAPSULE ORAL
Qty: 12 CAP | Refills: 1 | Status: SHIPPED | OUTPATIENT
Start: 2018-12-26 | End: 2019-08-16 | Stop reason: SDUPTHER

## 2019-03-14 DIAGNOSIS — I10 ESSENTIAL HYPERTENSION WITH GOAL BLOOD PRESSURE LESS THAN 130/80: ICD-10-CM

## 2019-03-14 DIAGNOSIS — E11.9 TYPE 2 DIABETES MELLITUS WITHOUT COMPLICATION, WITHOUT LONG-TERM CURRENT USE OF INSULIN (HCC): ICD-10-CM

## 2019-03-14 DIAGNOSIS — E78.00 HYPERCHOLESTEROLEMIA: ICD-10-CM

## 2019-03-14 RX ORDER — PRAVASTATIN SODIUM 20 MG/1
TABLET ORAL
Qty: 90 TAB | Refills: 0 | Status: SHIPPED | OUTPATIENT
Start: 2019-03-14 | End: 2019-07-15 | Stop reason: SDUPTHER

## 2019-03-14 RX ORDER — METFORMIN HYDROCHLORIDE 1000 MG/1
TABLET ORAL
Qty: 60 TAB | Refills: 2 | Status: SHIPPED | OUTPATIENT
Start: 2019-03-14 | End: 2019-07-25 | Stop reason: SDUPTHER

## 2019-04-04 ENCOUNTER — OFFICE VISIT (OUTPATIENT)
Dept: FAMILY MEDICINE CLINIC | Age: 34
End: 2019-04-04

## 2019-04-04 VITALS
SYSTOLIC BLOOD PRESSURE: 138 MMHG | BODY MASS INDEX: 45.1 KG/M2 | TEMPERATURE: 98.4 F | OXYGEN SATURATION: 98 % | HEART RATE: 87 BPM | WEIGHT: 315 LBS | RESPIRATION RATE: 18 BRPM | HEIGHT: 70 IN | DIASTOLIC BLOOD PRESSURE: 88 MMHG

## 2019-04-04 DIAGNOSIS — E11.9 TYPE 2 DIABETES MELLITUS WITHOUT COMPLICATION, WITHOUT LONG-TERM CURRENT USE OF INSULIN (HCC): ICD-10-CM

## 2019-04-04 DIAGNOSIS — E55.9 VITAMIN D DEFICIENCY: ICD-10-CM

## 2019-04-04 DIAGNOSIS — E78.00 PURE HYPERCHOLESTEROLEMIA: ICD-10-CM

## 2019-04-04 DIAGNOSIS — I10 ESSENTIAL HYPERTENSION WITH GOAL BLOOD PRESSURE LESS THAN 130/80: ICD-10-CM

## 2019-04-04 RX ORDER — METAXALONE 800 MG/1
TABLET ORAL
COMMUNITY
Start: 2019-03-20

## 2019-04-04 NOTE — PROGRESS NOTES
[facility] is a 29 y.o. male  Chief Complaint   Patient presents with    Diabetes     no recent labs    Hypertension     Weight -Reports he has lost some weight. He is taking his medications as prescribed but he has not taken his blood glucose. Denies hypo or hyperglycemic episodes. Hypertension - denies chest pain and or chest palpitations. Takes medication as prescribed. Denies dizziness. Denies leg pains or swelling. Cholesterol - denies leg pains. Denies abdominal pain and or nausea. Takes medication (pravastatin) as prescribed. Past Medical History  Past Medical History:   Diagnosis Date    Diabetes ([address]     Hypercholesterolemia     Hypertension        Surgical History  No past surgical history on file. Medications  Current Outpatient Medications   Medication Sig Dispense Refill    pravastatin (PRAVACHOL) 20 mg tablet TAKE 1 TABLET BY MOUTH NIGHTLY 90 Tab 0    metFORMIN (GLUCOPHAGE) 1,000 mg tablet TAKE 1 TABLET BY MOUTH TWICE DAILY WITH MEALS 60 Tab 2    VITAMIN D2 50,000 unit capsule TAKE 1 CAPSULE BY MOUTH ONCE A WEEK 12 Cap 1    lisinopril (PRINIVIL, ZESTRIL) 20 mg tablet TAKE 1 TABLET BY MOUTH ONCE DAILY 30 Tab 4    Blood-Glucose Meter monitoring kit Check blood sugar every other day after largest meal of the day. Goal is 150-170. 1 Kit 0    lancets 32 gauge misc Check blood sugar every other day after largest meal of the day. Goal is 150-170. 100 Lancet 3    glucose blood VI test strips (BLOOD GLUCOSE TEST) strip Check blood sugar every other day after largest meal of the day.  Goal is 150-170. 100 Strip 3    metaxalone (SKELAXIN) 800 mg tablet          Allergies  No Known Allergies    Family History  Family History   Problem Relation Age of Onset    Hypertension Mother     Cancer Father         colon    Hypertension Father        Social History  Social History     Socioeconomic History    Marital status: SINGLE     Spouse name: Not on file    Number of children: Not on file    Years of education: Not on file    Highest education level: Not on file   Occupational History    Not on file   Social Needs    Financial resource strain: Not on file    Food insecurity:     Worry: Not on file     Inability: Not on file    Transportation needs:     Medical: Not on file     Non-medical: Not on file   Tobacco Use    Smoking status: Never Smoker    Smokeless tobacco: Former User   Substance and Sexual Activity    Alcohol use: Yes     Alcohol/week: 1.2 oz     Types: 2 Cans of beer per week    Drug use: No    Sexual activity: Yes   Lifestyle    Physical activity:     Days per week: Not on file     Minutes per session: Not on file    Stress: Not on file   Relationships    Social connections:     Talks on phone: Not on file     Gets together: Not on file     Attends [employer] service: Not on file     Active member of club or organization: Not on file     Attends meetings of clubs or organizations: Not on file     Relationship status: Not on file    Intimate partner violence:     Fear of current or ex partner: Not on file     Emotionally abused: Not on file     Physically abused: Not on file     Forced sexual activity: Not on file   Other Topics Concern    Not on file   Social History Narrative    Not on file       Problem List  Patient Active Problem List   Diagnosis Code    Type 2 diabetes mellitus without complication, without long-term current use of insulin ([facility] E11.9    Preventive measure Z29.9    Essential hypertension with goal blood pressure less than 130/80 I10    Vitamin D deficiency E55.9    Obesity, morbid ([facility] E66.01    Pure hypercholesterolemia E78.00       Review of Systems  Review of Systems   Eyes: Negative for blurred vision. Respiratory: Negative for shortness of breath. Cardiovascular: Negative for chest pain and palpitations. Gastrointestinal: Negative for nausea and vomiting. Genitourinary: Negative.     Skin: Negative for itching and rash.   Neurological: Negative for dizziness. Endo/Heme/Allergies: Negative for polydipsia. Vital Signs  Vitals:    04/04/19 1324 04/04/19 1331   BP: 142/88 138/88   Pulse: 87    Resp: 18    Temp: 98.4 °F (36.9 °C)    TempSrc: Oral    SpO2: 98%    Weight: (!) 359 lb 3.2 oz (162.9 kg)    Height: 5' 10\" (1.778 m)    PainSc:   0 - No pain        Physical Exam  Physical Exam   Constitutional: He is oriented to person, place, and time. HENT:   Mouth/Throat: Oropharynx is clear and moist.   Eyes: Pupils are equal, round, and reactive to light. Cardiovascular: Normal rate, regular rhythm and normal heart sounds. Pulmonary/Chest: Effort normal and breath sounds normal.   Abdominal: Soft. Bowel sounds are normal. There is no tenderness. Musculoskeletal: Normal range of motion. Neurological: He is alert and oriented to person, place, and time. Skin: Skin is warm and dry. Psychiatric: He has a normal mood and affect. His behavior is normal.   Vitals reviewed.       Diagnostics  Orders Placed This Encounter    METABOLIC PANEL, COMPREHENSIVE     Standing Status:   Future     Standing Expiration Date:   4/4/2020    LIPID PANEL     Standing Status:   Future     Standing Expiration Date:   4/4/2020    CBC WITH AUTOMATED DIFF     Standing Status:   Future     Standing Expiration Date:   4/4/2020    TSH 3RD GENERATION     Standing Status:   Future     Standing Expiration Date:   4/4/2020    HEMOGLOBIN A1C WITH EAG     Standing Status:   Future     Standing Expiration Date:   4/4/2020    VITAMIN D, 25 HYDROXY     Standing Status:   Future     Standing Expiration Date:   4/4/2020    REFERRAL TO PODIATRY     Referral Priority:   Routine     Referral Type:   Consultation     Referral Reason:   Specialty Services Required     Number of Visits Requested:   1    REFERRAL TO OPHTHALMOLOGY     Referral Priority:   Routine     Referral Type:   Consultation     Referral Reason:   Specialty Services Required Number of Visits Requested:   1    metaxalone (SKELAXIN) 800 mg tablet       Results  Results for orders placed or performed in visit on 11/15/18   AMB POC HEMOGLOBIN A1C   Result Value Ref Range    Hemoglobin A1c (POC) 6.4 %       Assessment and Plan  Diagnoses and all orders for this visit:    1. BMI 50.0-59.9, adult ([facility])  -     METABOLIC PANEL, COMPREHENSIVE; Future  -     LIPID PANEL; Future  -     CBC WITH AUTOMATED DIFF; Future  -     TSH 3RD GENERATION; Future  -     HEMOGLOBIN A1C WITH EAG; Future  -     VITAMIN D, 25 HYDROXY; Future    2. Type 2 diabetes mellitus without complication, without long-term current use of insulin ([facility])  -     METABOLIC PANEL, COMPREHENSIVE; Future  -     LIPID PANEL; Future  -     CBC WITH AUTOMATED DIFF; Future  -     TSH 3RD GENERATION; Future  -     HEMOGLOBIN A1C WITH EAG; Future  -     VITAMIN D, 25 HYDROXY; Future  -     REFERRAL TO PODIATRY  -     REFERRAL TO OPHTHALMOLOGY    3. Vitamin D deficiency    4. Essential hypertension with goal blood pressure less than 130/80    5. Pure hypercholesterolemia      Patient to go for fasting labs. Stressed importance of diet and exercise. Encouraged to take blood glucose. After care summary printed and reviewed with patient. Plan reviewed with patient. Questions answered. Patient verbalized understanding of plan and is in agreement with plan. Patient to follow up in four months or earlier if symptoms worsen. Follow-up and Dispositions    · Return in about 4 months (around 8/4/2019), or if symptoms worsen or fail to improve.        Yuli Tillman, MOOSE-C

## 2019-04-04 NOTE — PROGRESS NOTES
Saji Erwin presents today for   Chief Complaint   Patient presents with    Diabetes     no recent labs    Hypertension       Is someone accompanying this pt? No    Is the patient using any DME equipment during OV? No    Depression Screening:  3 most recent PHQ Screens 4/4/2019   Little interest or pleasure in doing things Not at all   Feeling down, depressed, irritable, or hopeless Not at all   Total Score PHQ 2 0       Learning Assessment:  Learning Assessment 10/18/2018   PRIMARY LEARNER Patient   HIGHEST LEVEL OF EDUCATION - PRIMARY LEARNER  SOME COLLEGE   BARRIERS PRIMARY LEARNER NONE   CO-LEARNER CAREGIVER No   PRIMARY LANGUAGE ENGLISH    NEED No   LEARNER PREFERENCE PRIMARY DEMONSTRATION   ANSWERED BY patient   RELATIONSHIP SELF       Abuse Screening:  Abuse Screening Questionnaire 10/18/2018   Do you ever feel afraid of your partner? N   Are you in a relationship with someone who physically or mentally threatens you? N   Is it safe for you to go home? Y         Health Maintenance Due   Topic Date Due    Pneumococcal 0-64 years (1 of 1 - PPSV23) 11/19/1992    EYE EXAM RETINAL OR DILATED  11/19/1996    FOOT EXAM Q1  04/13/2018   . Health Maintenance reviewed and discussed and ordered per Provider. Saji Erwin is updated on all     Coordination of Care  1. Have you been to the ER, urgent care clinic since your last visit? Hospitalized since your last visit? Now [address] [place] [address] 3//15/19 r/t Urinary symptoms. 2. Have you seen or consulted any other health care providers outside of the [address] [place] [address] since your last visit? Include any pap smears or colon screening. No        Advance Directive:  1. Do you have an advance directive in place? Patient Reply:No    2. If not, would you like material regarding how to put one in place?  Patient Reply: No

## 2019-04-06 PROBLEM — E78.00 PURE HYPERCHOLESTEROLEMIA: Status: ACTIVE | Noted: 2019-04-06

## 2019-07-15 DIAGNOSIS — I10 ESSENTIAL HYPERTENSION WITH GOAL BLOOD PRESSURE LESS THAN 130/80: ICD-10-CM

## 2019-07-15 DIAGNOSIS — E11.9 TYPE 2 DIABETES MELLITUS WITHOUT COMPLICATION, WITHOUT LONG-TERM CURRENT USE OF INSULIN (HCC): ICD-10-CM

## 2019-07-15 DIAGNOSIS — E78.00 HYPERCHOLESTEROLEMIA: ICD-10-CM

## 2019-07-15 RX ORDER — PRAVASTATIN SODIUM 20 MG/1
TABLET ORAL
Qty: 90 TAB | Refills: 0 | Status: SHIPPED | OUTPATIENT
Start: 2019-07-15

## 2019-08-16 DIAGNOSIS — E55.9 VITAMIN D DEFICIENCY: ICD-10-CM

## 2019-08-20 RX ORDER — ERGOCALCIFEROL 1.25 MG/1
CAPSULE ORAL
Qty: 12 CAP | Refills: 1 | Status: SHIPPED | OUTPATIENT
Start: 2019-08-20

## 2019-09-26 NOTE — PROGRESS NOTES
History and Physical    Patient: William Madden MRN: [id number]  SSN: [id number]    YOB: 1985  Age: 28 y.o. Sex: male      Subjective:      William Madden is a 28 y.o. male who is here for follow up. The patient would like to review his labs. Patient does not mention any chest pain, abdominal pain or shortness of breath. Past Medical History:   Diagnosis Date    Diabetes ([facility]     Hypercholesterolemia     Hypertension          Prior to Admission medications    Medication Sig Start Date End Date Taking? Authorizing Provider   metFORMIN (GLUCOPHAGE) 500 mg tablet Take  by mouth two (2) times daily (with meals). Historical Provider   pravastatin (PRAVACHOL) 20 mg tablet Take 1 Tab by mouth nightly. 4/6/17   Ana Luisa PERALTA Caty, DO   lisinopril (PRINIVIL, ZESTRIL) 20 mg tablet Take 1 Tab by mouth daily. 4/6/17   Ana Luisa PERALTA Caty, DO        No Known Allergies    Review of Systems:  Pertinent ROS noted in HPI    Objective:     Visit Vitals    /69 (BP 1 Location: Right arm, BP Patient Position: Sitting)    Pulse 76    Temp 97.3 °F (36.3 °C) (Oral)    Resp 17    Ht 5' 10\" (1.778 m)    Wt (!) 368 lb (166.9 kg)    SpO2 98%    BMI 52.8 kg/m2           Physical Exam:  GENERAL: alert, cooperative, no distress, appears stated age, morbidly obese  EYE: conjunctivae/corneas clear. PERRL, EOM's intact. Fundi benign  LYMPHATIC: Cervical, supraclavicular, and axillary nodes normal.   THROAT & NECK: normal and no erythema or exudates noted. LUNG: clear to auscultation bilaterally  HEART: regular rate and rhythm, S1, S2 normal, no murmur, click, rub or gallop  ABDOMEN: soft, non-tender.  Bowel sounds normal. No masses,  no organomegaly, abnormal findings:  obese  EXTREMITIES:  extremities normal, atraumatic, no cyanosis or edema  SKIN: Normal.        Labs:     Lab Results   Component Value Date/Time    Hemoglobin A1c 7.1 04/12/2017 10:55 AM     Lab Results   Component Value Date/Time    Sodium 139 04/12/2017 10:55 AM    Potassium 4.1 04/12/2017 10:55 AM    Chloride 103 04/12/2017 10:55 AM    CO2 27 04/12/2017 10:55 AM    Anion gap 9 04/12/2017 10:55 AM    Glucose 97 04/12/2017 10:55 AM    BUN 14 04/12/2017 10:55 AM    Creatinine 1.02 04/12/2017 10:55 AM    BUN/Creatinine ratio 14 04/12/2017 10:55 AM    GFR est AA >60 04/12/2017 10:55 AM    GFR est non-AA >60 04/12/2017 10:55 AM    Calcium 8.8 04/12/2017 10:55 AM    Bilirubin, total 0.3 04/12/2017 10:55 AM    AST (SGOT) 33 04/12/2017 10:55 AM    Alk. phosphatase 41 04/12/2017 10:55 AM    Protein, total 7.1 04/12/2017 10:55 AM    Albumin 3.5 04/12/2017 10:55 AM    Globulin 3.6 04/12/2017 10:55 AM    A-G Ratio 1.0 04/12/2017 10:55 AM    ALT (SGPT) 52 04/12/2017 10:55 AM     Lab Results   Component Value Date/Time    Cholesterol, total 147 04/12/2017 10:55 AM    HDL Cholesterol 34 04/12/2017 10:55 AM    LDL, calculated 84 04/12/2017 10:55 AM    Triglyceride 145 04/12/2017 10:55 AM     Lab Results   Component Value Date/Time    WBC 6.4 04/12/2017 10:55 AM    HGB 12.1 04/12/2017 10:55 AM    HCT 38.4 04/12/2017 10:55 AM    PLATELET 559 71/19/6158 10:55 AM    MCV 87.1 04/12/2017 10:55 AM             Assessment:       1.) Non-Insulin Dependent Diabetes Mellitus Type 2: Patient will was advised to double up on Metformin for a total of 2,000 mg a day. HgbA1C ordered. 2.) Vitamin D Deficiency: Patient will be placed on once weekly Vitamin D 50,000 units. 3.) Hyperlipidemia: Patient will continue on pravastatin. Lipid cascade reviewed. 4.) Essential Hypertension: Patient will continue on lisinopril. 5.) Morbid Obesity: On next visit we will discuss weight loss medications. 6.)  Preventive: Labs ordered as noted below. I personally reviewed and discussed labs with the patient. Patient will return in 3 months for follow up.        Plan:     Orders Placed This Encounter    HEMOGLOBIN A1C WITH EAG    METABOLIC PANEL, COMPREHENSIVE    CBC WITH AUTOMATED DIFF    VITAMIN D, 25 HYDROXY    MICROALBUMIN, UR, RAND W/ MICROALBUMIN/CREA RATIO    ergocalciferol (ERGOCALCIFEROL) 50,000 unit capsule    metFORMIN (GLUCOPHAGE) 1,000 mg tablet             Signed By: Almita Overton DO     April 13, 2017 89y old sent in for evaluation of passing out, plab to check labs, ct fluids, nausea medicine, and re evaluate
